# Patient Record
Sex: MALE | Race: WHITE | NOT HISPANIC OR LATINO | Employment: FULL TIME | URBAN - METROPOLITAN AREA
[De-identification: names, ages, dates, MRNs, and addresses within clinical notes are randomized per-mention and may not be internally consistent; named-entity substitution may affect disease eponyms.]

---

## 2017-01-06 ENCOUNTER — GENERIC CONVERSION - ENCOUNTER (OUTPATIENT)
Dept: OTHER | Facility: OTHER | Age: 57
End: 2017-01-06

## 2017-02-28 ENCOUNTER — TRANSCRIBE ORDERS (OUTPATIENT)
Dept: URGENT CARE | Facility: MEDICAL CENTER | Age: 57
End: 2017-02-28

## 2017-02-28 ENCOUNTER — HOSPITAL ENCOUNTER (OUTPATIENT)
Dept: RADIOLOGY | Facility: MEDICAL CENTER | Age: 57
Discharge: HOME | End: 2017-02-28
Admitting: PREVENTIVE MEDICINE
Payer: OTHER MISCELLANEOUS

## 2017-02-28 ENCOUNTER — APPOINTMENT (OUTPATIENT)
Dept: URGENT CARE | Facility: MEDICAL CENTER | Age: 57
End: 2017-02-28
Payer: OTHER MISCELLANEOUS

## 2017-02-28 DIAGNOSIS — T14.90XA INJURY: ICD-10-CM

## 2017-02-28 DIAGNOSIS — T14.90XA INJURY: Primary | ICD-10-CM

## 2017-02-28 PROCEDURE — 99213 OFFICE O/P EST LOW 20 MIN: CPT

## 2017-02-28 PROCEDURE — 73564 X-RAY EXAM KNEE 4 OR MORE: CPT

## 2017-03-28 ENCOUNTER — APPOINTMENT (OUTPATIENT)
Dept: URGENT CARE | Facility: MEDICAL CENTER | Age: 57
End: 2017-03-28
Payer: OTHER MISCELLANEOUS

## 2017-03-28 PROCEDURE — 99213 OFFICE O/P EST LOW 20 MIN: CPT

## 2017-03-28 PROCEDURE — 20605 DRAIN/INJ JOINT/BURSA W/O US: CPT

## 2017-04-04 ENCOUNTER — APPOINTMENT (OUTPATIENT)
Dept: URGENT CARE | Facility: MEDICAL CENTER | Age: 57
End: 2017-04-04
Payer: OTHER MISCELLANEOUS

## 2017-04-04 PROCEDURE — 99213 OFFICE O/P EST LOW 20 MIN: CPT

## 2017-04-11 ENCOUNTER — APPOINTMENT (OUTPATIENT)
Dept: URGENT CARE | Facility: MEDICAL CENTER | Age: 57
End: 2017-04-11
Payer: OTHER MISCELLANEOUS

## 2017-04-11 PROCEDURE — 99213 OFFICE O/P EST LOW 20 MIN: CPT

## 2017-05-10 ENCOUNTER — GENERIC CONVERSION - ENCOUNTER (OUTPATIENT)
Dept: OTHER | Facility: OTHER | Age: 57
End: 2017-05-10

## 2017-08-18 ENCOUNTER — HOSPITAL ENCOUNTER (OUTPATIENT)
Dept: RADIOLOGY | Age: 57
Discharge: HOME | End: 2017-08-18
Attending: THORACIC SURGERY (CARDIOTHORACIC VASCULAR SURGERY)
Payer: COMMERCIAL

## 2017-08-18 DIAGNOSIS — Q23.1 CONGENITAL INSUFFICIENCY OF AORTIC VALVE: ICD-10-CM

## 2017-08-18 PROCEDURE — 71250 CT THORAX DX C-: CPT

## 2017-08-24 ENCOUNTER — ALLSCRIPTS OFFICE VISIT (OUTPATIENT)
Dept: OTHER | Facility: OTHER | Age: 57
End: 2017-08-24

## 2018-01-15 VITALS
HEART RATE: 90 BPM | BODY MASS INDEX: 22.33 KG/M2 | SYSTOLIC BLOOD PRESSURE: 128 MMHG | HEIGHT: 74 IN | RESPIRATION RATE: 14 BRPM | OXYGEN SATURATION: 98 % | WEIGHT: 174 LBS | DIASTOLIC BLOOD PRESSURE: 82 MMHG | TEMPERATURE: 98.6 F

## 2018-05-24 DIAGNOSIS — I72.9 ANEURYSM (HCC): Primary | ICD-10-CM

## 2018-05-25 ENCOUNTER — TRANSCRIBE ORDERS (OUTPATIENT)
Dept: LAB | Facility: CLINIC | Age: 58
End: 2018-05-25

## 2018-05-25 ENCOUNTER — APPOINTMENT (OUTPATIENT)
Dept: LAB | Facility: CLINIC | Age: 58
End: 2018-05-25
Payer: COMMERCIAL

## 2018-05-25 DIAGNOSIS — I72.9 ANEURYSM (HCC): ICD-10-CM

## 2018-05-25 LAB
BUN SERPL-MCNC: 20 MG/DL (ref 5–25)
CREAT SERPL-MCNC: 1.13 MG/DL (ref 0.6–1.3)
GFR SERPL CREATININE-BSD FRML MDRD: 71 ML/MIN/1.73SQ M

## 2018-05-25 PROCEDURE — 82565 ASSAY OF CREATININE: CPT

## 2018-05-25 PROCEDURE — 84520 ASSAY OF UREA NITROGEN: CPT

## 2018-05-25 PROCEDURE — 36415 COLL VENOUS BLD VENIPUNCTURE: CPT

## 2018-05-31 ENCOUNTER — HOSPITAL ENCOUNTER (OUTPATIENT)
Dept: RADIOLOGY | Facility: HOSPITAL | Age: 58
Discharge: HOME | End: 2018-05-31
Attending: THORACIC SURGERY (CARDIOTHORACIC VASCULAR SURGERY)
Payer: COMMERCIAL

## 2018-05-31 ENCOUNTER — TRANSCRIBE ORDERS (OUTPATIENT)
Dept: RADIOLOGY | Facility: HOSPITAL | Age: 58
End: 2018-05-31

## 2018-05-31 DIAGNOSIS — I72.9 ANEURYSM (HCC): ICD-10-CM

## 2018-05-31 PROCEDURE — 71275 CT ANGIOGRAPHY CHEST: CPT

## 2018-05-31 RX ADMIN — IOHEXOL 90 ML: 350 INJECTION, SOLUTION INTRAVENOUS at 14:39

## 2018-06-08 ENCOUNTER — OFFICE VISIT (OUTPATIENT)
Dept: CARDIAC SURGERY | Facility: CLINIC | Age: 58
End: 2018-06-08
Payer: COMMERCIAL

## 2018-06-08 VITALS
OXYGEN SATURATION: 98 % | DIASTOLIC BLOOD PRESSURE: 68 MMHG | SYSTOLIC BLOOD PRESSURE: 102 MMHG | HEIGHT: 74 IN | TEMPERATURE: 97.4 F | RESPIRATION RATE: 14 BRPM | WEIGHT: 169 LBS | HEART RATE: 66 BPM | BODY MASS INDEX: 21.69 KG/M2

## 2018-06-08 DIAGNOSIS — Q23.1 BICUSPID AORTIC VALVE: Primary | ICD-10-CM

## 2018-06-08 DIAGNOSIS — I71.9 ANEURYSM OF AORTIC ROOT (HCC): ICD-10-CM

## 2018-06-08 PROCEDURE — 99214 OFFICE O/P EST MOD 30 MIN: CPT | Performed by: NURSE PRACTITIONER

## 2018-06-08 RX ORDER — DEXLANSOPRAZOLE 60 MG/1
1 CAPSULE, DELAYED RELEASE ORAL AS NEEDED
COMMUNITY
Start: 2011-10-05 | End: 2021-06-22 | Stop reason: HOSPADM

## 2018-06-08 RX ORDER — ATENOLOL 25 MG/1
25 TABLET ORAL 2 TIMES DAILY
Refills: 0 | COMMUNITY
Start: 2018-06-03 | End: 2019-06-10 | Stop reason: SDUPTHER

## 2018-06-08 RX ORDER — IBUPROFEN 600 MG/1
600 TABLET ORAL AS NEEDED
COMMUNITY
Start: 2017-05-06

## 2018-06-08 RX ORDER — DICLOFENAC SODIUM 75 MG/1
75 TABLET, DELAYED RELEASE ORAL AS NEEDED
COMMUNITY

## 2018-06-08 RX ORDER — LEVOTHYROXINE SODIUM 88 UG/1
1 TABLET ORAL DAILY
Refills: 0 | COMMUNITY
Start: 2018-06-05

## 2018-06-08 NOTE — PROGRESS NOTES
Aortic Clinic  Nicolas Walden 61 y o  male MRN: [id number]    Physician Requesting Consult: Dr Katherine Liu    Reason for Consult / Principal Problem: Bicuspid Aortic Valve and Aortic Root Aneurysm    History of Present Illness: Nicolas Walden is a 61y o  year old male, known to our service and followed in our aortic clinic for a bicuspid aortic valve and aortic root aneurysm  He returns to our office today, earlier than he previously scheduled appointment, at the request of his cardiologist Dr Katherine Liu  He was diagnosed with a bicuspid aortic valve and associated aortic root aneurysm in 2011 when he underwent routine cancer surveillance imaging after treatment for 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)  At that time (2011), his aortic root aneurysm measured 48 mm at the SOV and his BAV demonstrated only mild AS  He was seen in our aortic clinic for initial consultation in 2016 at which time his aneurysm appeared stable on CT scan, measuring 46 mm and echo demonstrated no change in his BAV function  He was seen again August 2017 for routine follow up with echo and CT scan  His aneurysm was noted to be stable in size and echo demonstrated stable findings of is BAV  He returns today due to concerns from his cardiologist of possible enlargement of his aneurysm  Upon interview today, Gene Gama denies any significant change in how he is feeling  He exercises regularly but had stopped at the recommendation of his cardiologist until he was seen in out office  He admits to slight increase in fatigue over the past several months but attributes it to eliminating caffeine from his diet and not exercising regularly  He aslo notes he becomes dizzy when he takes hot showers and therefore has had to reduce the temp of thew water  He also experiences occasional orthostasis  He denies SOB, angina, presyncope/syncope, weight gain, edema, PND or orthopnea       Recent CTA demonstrates no change in the size of his aortic root aneurysm, maximal diameter measures 46 mm and his echo demonstrates mild AS (no change)  Past Medical History:  Past Medical History:   Diagnosis Date    Aneurysm of aortic root ([address] )     Bicuspid aortic valve     H/O hyperthyroidism     History of diverticulitis     History of malignant neoplasm of tonsil     History of rectal fissure     Hypertension          Past Surgical History:   Past Surgical History:   Procedure Laterality Date    INGUINAL HERNIA REPAIR      TONSILLECTOMY      UMBILICAL HERNIA REPAIR           Family History:  Family History   Problem Relation Age of Onset    Dementia Mother     Other Family      Sarcoma         Social History:    History   Alcohol Use No     History   Drug Use No     History   Smoking Status    Former Smoker    Packs/day: 0 50    Years: 20 00    Types: Cigarettes    Quit date: 1998   Smokeless Tobacco    Never Used     Comment: on/off X about 20 years         Home Medications:   Prior to Admission medications    Medication Sig Start Date End Date Taking?  Authorizing Provider   atenolol (TENORMIN) 25 mg tablet Take 25 mg by mouth 2 (two) times a day 6/3/18  Yes Historical Provider, MD   dexlansoprazole (DEXILANT) 60 MG capsule Take 1 tablet by mouth as needed 10/5/11  Yes Historical Provider, MD   diclofenac (VOLTAREN) 75 mg EC tablet Take 75 mg by mouth as needed   Yes Historical Provider, MD   ibuprofen (MOTRIN) 600 mg tablet Take 600 mg by mouth as needed 5/6/17  Yes Historical Provider, MD   levothyroxine 88 mcg tablet Take 1 tablet by mouth daily 6/5/18  Yes Historical Provider, MD       Allergies:  No Known Allergies    Review of Systems:   Review of Systems - History obtained from chart review and the patient  General ROS: positive for  - fatigue  Psychological ROS: negative  Ophthalmic ROS: no visual disturbances  Hematological and Lymphatic ROS: negative for - bleeding problems, blood clots or bruising  Respiratory ROS: no cough, shortness of breath, or wheezing  Cardiovascular ROS: no chest pain or dyspnea on exertion  Gastrointestinal ROS: no abdominal pain, change in bowel habits, or black or bloody stools  Musculoskeletal ROS: negative  Neurological ROS: no TIA or stroke symptoms    Vital Signs:   Vitals:    06/08/18 1300 06/08/18 1320   BP: 104/68 102/68   BP Location: Left arm Right arm   Cuff Size: Adult    Pulse: 66    Resp: 14    Temp: (!) 97 4 °F (36 3 °C)    TempSrc: Oral    SpO2: 98%    Weight: 76 7 kg (169 lb)    Height: 6' 1 5" (1 867 m)        Physical Exam:  General: well developed, no acute distress  HEENT/NECK:  PERRLA  No jugular venous distention  Cardiac:Regular rate and rhythm, No murmurs, rubs or gallops  Carotid arteries: 2+ pulses, no bruits  Pulmonary:  Breath sounds clear bilaterally  Abdomen:  Non-tender, Non-distended  Positive bowel sounds  Upper extremities: 2+ radial pulses, brisk capillary refill  Lower extremities: Extremities warm/dry  Radial/PT/DP pulses 2+ bilaterally  No edema B/L  Neuro: Alert and oriented X 3  Sensation is grossly intact  No focal deficits  Skin: Warm/Dry, without rashes or lesions  Lab Results:   Lab Results   Component Value Date    CREATININE 1 13 05/25/2018    BUN 20 05/25/2018     Imaging Studies:     CTA Chest:   FINDINGS:     VASCULAR STRUCTURES:  Continuing enlargement of the fusiform dilatation of the proximal ascending aorta extending from the level of the bicuspid valve into the mid ascending aorta  After the level of the valve, the aorta measures a maximal 46 mm which   has increased in size from the study of Phyllis 3, 2016 when it measured 43 mm  The proximal aortic arch is a maximal 44 mm in transverse diameter (previously 39 mm)  The more distal ascending aorta and aortic arch are nonaneurysmal   The descending   thoracic aorta is nonaneurysmal   Classic branching anatomy of the aortic arch is present without stenoses in the origins of the great vessels  The celiac artery is ectatic measuring 11 mm  The superior mesenteric artery and bilateral renal arteries   are patent      OTHER FINDINGS:      HEART:  Normal cardiac size  No pericardial effusions      LUNGS:  Stable 2 mm left lower lobe pulmonary nodule (4/59) since February 18, 2011      PLEURA:  No pleural effusion      MEDIASTINUM AND ERIKA:  No mass or significant lymphadenopathy      CHEST WALL AND LOWER NECK:  Unremarkable      VISUALIZED STRUCTURES IN THE UPPER ABDOMEN:  Unremarkable      IMPRESSION:     Bicuspid aortic valve with fusiform aneurysm increasing in size from the previous study measuring a maximal 46 mm      Echocardiogram:   EF 65%  Bicuspid AV with superior-inferior orientation, mild AS  Ascending aorta (SOV) 42 mm    I have personally reviewed pertinent reports  Assessment:  Patient Active Problem List    Diagnosis Date Noted    Bicuspid aortic valve 08/18/2016    Aneurysm of aortic root ([address] ) 08/03/2016       Plan:    CTA imaging performed prior to this visit demonstrates the aortic root measuring 46 mm in size at its greatest diameter  This is stable and unchanged as compared to his prior imaging  His echo demonstrates stable findings, no change in the AS  These findings were confirmed and shared with the patient today  He has not had a significant change in symptoms and his imaging is stable, follow-up monitoring is the treatment plan  Arrangements have been made for future surveillance to be completed with non-contrast chest CT scan and transthoracic echocardiogram in 1 year  Antoinette Haider was comfortable with our recommendations, and his questions were answered to his satisfaction  Thank you for allowing us to participate in the care of this patient  Aortic Aneurysm Instructions were provided to the patient as follows:    1  No lifting more than 50 pounds  Regular aerobic exercise permitted and recommended     2  Maintain a controlled blood pressure with a goal of less than 140/80  3  Follow up in Aortic Clinic as recommended with radiology follow up as instructed  4  Report to the ER or call 911 immediately with the following signs / symptoms: sudden onset of back pain, chest pain or shortness of breath  5  Tobacco cessation discussed      SIGNATURE: RAVEN Stinson  DATE: June 8, 2018  TIME: 2:28 PM

## 2018-06-08 NOTE — LETTER
June 8, 2018     Eleuterio Bravo MD  [address]  [facility]  [address] [facility] [address] [address]    Patient: Deena Crane   YOB: 1960   Date of Visit: 6/8/2018       Dear Dr Bertrand Meigs: Thank you for referring David Moreno to me for evaluation  Below are my notes for this consultation  If you have questions, please do not hesitate to call me  I look forward to following your patient along with you  Sincerely,        Marylen Glad, MD        CC: MD Mitchell Harrison CRNP  6/8/2018  2:54 PM  Attested  Aortic Clinic  Deena Crane 61 y o  male MRN: [id number]    Physician Requesting Consult: Dr Bertrand Meigs    Reason for Consult / Principal Problem: Bicuspid Aortic Valve and Aortic Root Aneurysm    History of Present Illness: Deena Crane is a 61y o  year old male, known to our service and followed in our aortic clinic for a bicuspid aortic valve and aortic root aneurysm  He returns to our office today, earlier than he previously scheduled appointment, at the request of his cardiologist Dr Bertrand Meigs  He was diagnosed with a bicuspid aortic valve and associated aortic root aneurysm in 2011 when he underwent routine cancer surveillance imaging after treatment for 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)  At that time (2011), his aortic root aneurysm measured 48 mm at the SOV and his BAV demonstrated only mild AS  He was seen in our aortic clinic for initial consultation in 2016 at which time his aneurysm appeared stable on CT scan, measuring 46 mm and echo demonstrated no change in his BAV function  He was seen again August 2017 for routine follow up with echo and CT scan  His aneurysm was noted to be stable in size and echo demonstrated stable findings of is BAV  He returns today due to concerns from his cardiologist of possible enlargement of his aneurysm      Upon interview today, Shahbaz Marie denies any significant change in how he is feeling  He exercises regularly but had stopped at the recommendation of his cardiologist until he was seen in out office  He admits to slight increase in fatigue over the past several months but attributes it to eliminating caffeine from his diet and not exercising regularly  He aslo notes he becomes dizzy when he takes hot showers and therefore has had to reduce the temp of thew water  He also experiences occasional orthostasis  He denies SOB, angina, presyncope/syncope, weight gain, edema, PND or orthopnea  Recent CTA demonstrates no change in the size of his aortic root aneurysm, maximal diameter measures 46 mm and his echo demonstrates mild AS (no change)  Past Medical History:  Past Medical History:   Diagnosis Date    Aneurysm of aortic root ([address] )     Bicuspid aortic valve     H/O hyperthyroidism     History of diverticulitis     History of malignant neoplasm of tonsil     History of rectal fissure     Hypertension          Past Surgical History:   Past Surgical History:   Procedure Laterality Date    INGUINAL HERNIA REPAIR      TONSILLECTOMY      UMBILICAL HERNIA REPAIR           Family History:  Family History   Problem Relation Age of Onset    Dementia Mother     Other Family      Sarcoma         Social History:    History   Alcohol Use No     History   Drug Use No     History   Smoking Status    Former Smoker    Packs/day: 0 50    Years: 20 00    Types: Cigarettes    Quit date: 1998   Smokeless Tobacco    Never Used     Comment: on/off X about 20 years         Home Medications:   Prior to Admission medications    Medication Sig Start Date End Date Taking?  Authorizing Provider   atenolol (TENORMIN) 25 mg tablet Take 25 mg by mouth 2 (two) times a day 6/3/18  Yes Historical Provider, MD   dexlansoprazole (DEXILANT) 60 MG capsule Take 1 tablet by mouth as needed 10/5/11  Yes Historical Provider, MD   diclofenac (VOLTAREN) 75 mg EC tablet Take 75 mg by mouth as needed   Yes Historical Provider, MD   ibuprofen (MOTRIN) 600 mg tablet Take 600 mg by mouth as needed 5/6/17  Yes Historical Provider, MD   levothyroxine 88 mcg tablet Take 1 tablet by mouth daily 6/5/18  Yes Historical Provider, MD       Allergies:  No Known Allergies    Review of Systems:   Review of Systems - History obtained from chart review and the patient  General ROS: positive for  - fatigue  Psychological ROS: negative  Ophthalmic ROS: no visual disturbances  Hematological and Lymphatic ROS: negative for - bleeding problems, blood clots or bruising  Respiratory ROS: no cough, shortness of breath, or wheezing  Cardiovascular ROS: no chest pain or dyspnea on exertion  Gastrointestinal ROS: no abdominal pain, change in bowel habits, or black or bloody stools  Musculoskeletal ROS: negative  Neurological ROS: no TIA or stroke symptoms    Vital Signs:   Vitals:    06/08/18 1300 06/08/18 1320   BP: 104/68 102/68   BP Location: Left arm Right arm   Cuff Size: Adult    Pulse: 66    Resp: 14    Temp: (!) 97 4 °F (36 3 °C)    TempSrc: Oral    SpO2: 98%    Weight: 76 7 kg (169 lb)    Height: 6' 1 5" (1 867 m)        Physical Exam:  General: well developed, no acute distress  HEENT/NECK:  PERRLA  No jugular venous distention  Cardiac:Regular rate and rhythm, No murmurs, rubs or gallops  Carotid arteries: 2+ pulses, no bruits  Pulmonary:  Breath sounds clear bilaterally  Abdomen:  Non-tender, Non-distended  Positive bowel sounds  Upper extremities: 2+ radial pulses, brisk capillary refill  Lower extremities: Extremities warm/dry  Radial/PT/DP pulses 2+ bilaterally  No edema B/L  Neuro: Alert and oriented X 3  Sensation is grossly intact  No focal deficits  Skin: Warm/Dry, without rashes or lesions      Lab Results:   Lab Results   Component Value Date    CREATININE 1 13 05/25/2018    BUN 20 05/25/2018     Imaging Studies:     CTA Chest:   FINDINGS:     VASCULAR STRUCTURES:  Continuing enlargement of the fusiform dilatation of the proximal ascending aorta extending from the level of the bicuspid valve into the mid ascending aorta  After the level of the valve, the aorta measures a maximal 46 mm which   has increased in size from the study of Phyllis 3, 2016 when it measured 43 mm  The proximal aortic arch is a maximal 44 mm in transverse diameter (previously 39 mm)  The more distal ascending aorta and aortic arch are nonaneurysmal   The descending   thoracic aorta is nonaneurysmal   Classic branching anatomy of the aortic arch is present without stenoses in the origins of the great vessels  The celiac artery is ectatic measuring 11 mm  The superior mesenteric artery and bilateral renal arteries   are patent      OTHER FINDINGS:      HEART:  Normal cardiac size  No pericardial effusions      LUNGS:  Stable 2 mm left lower lobe pulmonary nodule (4/59) since February 18, 2011      PLEURA:  No pleural effusion      MEDIASTINUM AND ERIKA:  No mass or significant lymphadenopathy      CHEST WALL AND LOWER NECK:  Unremarkable      VISUALIZED STRUCTURES IN THE UPPER ABDOMEN:  Unremarkable      IMPRESSION:     Bicuspid aortic valve with fusiform aneurysm increasing in size from the previous study measuring a maximal 46 mm      Echocardiogram:   EF 65%  Bicuspid AV with superior-inferior orientation, mild AS  Ascending aorta (SOV) 42 mm    I have personally reviewed pertinent reports  Assessment:  Patient Active Problem List    Diagnosis Date Noted    Bicuspid aortic valve 08/18/2016    Aneurysm of aortic root ([address] ) 08/03/2016       Plan:    CTA imaging performed prior to this visit demonstrates the aortic root measuring 46 mm in size at its greatest diameter  This is stable and unchanged as compared to his prior imaging  His echo demonstrates stable findings, no change in the AS  These findings were confirmed and shared with the patient today        He has not had a significant change in symptoms and his imaging is stable, follow-up monitoring is the treatment plan  Arrangements have been made for future surveillance to be completed with non-contrast chest CT scan and transthoracic echocardiogram in 1 year  Florina Lacy was comfortable with our recommendations, and his questions were answered to his satisfaction  Thank you for allowing us to participate in the care of this patient  Aortic Aneurysm Instructions were provided to the patient as follows:    1  No lifting more than 50 pounds  Regular aerobic exercise permitted and recommended  2  Maintain a controlled blood pressure with a goal of less than 140/80  3  Follow up in Aortic Clinic as recommended with radiology follow up as instructed  4  Report to the ER or call 911 immediately with the following signs / symptoms: sudden onset of back pain, chest pain or shortness of breath  5  Tobacco cessation discussed  SIGNATURE: RAVEN Yin  DATE: June 8, 2018  TIME: 2:28 PM  Attestation signed by Polly Washburn MD at 6/8/2018  3:08 PM:  Patient seen and evaluated with 10 Avelina Garcia / ALEXANDAR  I agree with the above assessment and plan with the following additions  The patient is a 0 atrial man with an asymptomatic aortic root aneurysm and bicuspid aortic valve with mild aortic stenosis  Most recent CTA of the chest reveals a root with a maximum diameter of 4 5 cms at the level of the sinus of Valsalva, ascending aorta is of normal size at 3 6 cms  TTE shows same measurement at the level of the sinus of Valsalva  This is unchanged from previous studies on August 2017  There is no need for surgical intervention at this point, I recommend follow-up in 1 year with a CT and a transthoracic echo

## 2019-02-13 ENCOUNTER — OFFICE VISIT (OUTPATIENT)
Dept: CARDIOLOGY CLINIC | Facility: CLINIC | Age: 59
End: 2019-02-13
Payer: COMMERCIAL

## 2019-02-13 VITALS
DIASTOLIC BLOOD PRESSURE: 94 MMHG | BODY MASS INDEX: 23.06 KG/M2 | WEIGHT: 174 LBS | HEIGHT: 73 IN | HEART RATE: 72 BPM | SYSTOLIC BLOOD PRESSURE: 126 MMHG

## 2019-02-13 DIAGNOSIS — Q23.1 BICUSPID AORTIC VALVE: Primary | ICD-10-CM

## 2019-02-13 DIAGNOSIS — I71.9 ANEURYSM OF AORTIC ROOT (HCC): ICD-10-CM

## 2019-02-13 PROBLEM — I10 ESSENTIAL HYPERTENSION: Status: ACTIVE | Noted: 2019-02-13

## 2019-02-13 PROCEDURE — 93000 ELECTROCARDIOGRAM COMPLETE: CPT | Performed by: INTERNAL MEDICINE

## 2019-02-13 PROCEDURE — 99204 OFFICE O/P NEW MOD 45 MIN: CPT | Performed by: INTERNAL MEDICINE

## 2019-02-13 NOTE — PROGRESS NOTES
[facility]     Obed Torres / 60 y o  male / : 1960 / MRN: [id number]  Date of Visit: 19    ASSESSMENT/PLAN    Bicuspid aortic valve  · Mild AS by last echo     Aneurysm of aortic root ([facility] [address] )  · 4 2cm by last echo  Will repeat echo in 1 year  · Patient reports this has remained stable for 8-9 years  Essential hypertension  · Well controlled on atenolol 25mg BID    Echo and f/u visit in 1 year  SUBJECTIVE:  HPI: Obed Torres is a 60y o  year-old male who presents to transfer care regarding bicuspid aortic valve, thoracic aortic aneurysm, and HTN  Today he has no complaints other than some lightheadedness if he gets out of bed too quickly  His BP has been well controlled on atenolol and averages 120s/70-80s  He reports that his aneurysm has been stable for 8-9 years  It was found incidentally on CT while he was undergoing treatment for tonsillar cancer  He denies recent chest pain, palpitations, dyspnea, edema, near-syncope and syncope      EKG: Sinus rhythm, LA enlargement, LVH with secondary ST changes    Other cardiac testing:   Echo May 2018:   · Normal LV systolic function, EF 77%   · Bicuspid aortic valve with superior-inferior orientation  · Mild AS, mild MR   · Dilation of ascending aorta, 4 2cm    No Known Allergies      Current Outpatient Medications:     atenolol (TENORMIN) 25 mg tablet, Take 25 mg by mouth 2 (two) times a day, Disp: , Rfl: 0    diclofenac (VOLTAREN) 75 mg EC tablet, Take 75 mg by mouth as needed, Disp: , Rfl:     levothyroxine 88 mcg tablet, Take 1 tablet by mouth daily, Disp: , Rfl: 0    dexlansoprazole (DEXILANT) 60 MG capsule, Take 1 tablet by mouth as needed, Disp: , Rfl:     ibuprofen (MOTRIN) 600 mg tablet, Take 600 mg by mouth as needed, Disp: , Rfl:     Past Medical History:   Diagnosis Date    Aneurysm of aortic root ([facility] [address] )     Bicuspid aortic valve     H/O hyperthyroidism     History of diverticulitis     History of echocardiogram 2018    EF 65%, bicuspid AV with superior-inferior irientation, mild AS, mild MR, trace TR, mildly dilated ascending aorta (sinus of valsalva is 4 2-cm)    History of malignant neoplasm of tonsil     History of rectal fissure     Hypertension     Hypothyroidism        Family History   Problem Relation Age of Onset    Dementia Mother     Other Family         Sarcoma       Past Surgical History:   Procedure Laterality Date    INGUINAL HERNIA REPAIR      TONSILLECTOMY      UMBILICAL HERNIA REPAIR         Social History     Socioeconomic History    Marital status: /Civil Union     Spouse name: Not on file    Number of children: Not on file    Years of education: Not on file    Highest education level: Not on file   Occupational History    Not on file   Social Needs    Financial resource strain: Not on file    Food insecurity:     Worry: Not on file     Inability: Not on file    Transportation needs:     Medical: Not on file     Non-medical: Not on file   Tobacco Use    Smoking status: Former Smoker     Packs/day: 0 50     Years: 20 00     Pack years: 10 00     Types: Cigarettes     Last attempt to quit:      Years since quittin 1    Smokeless tobacco: Current User    Tobacco comment: on/off X about 20 years   Substance and Sexual Activity    Alcohol use: No    Drug use: No    Sexual activity: Not on file   Lifestyle    Physical activity:     Days per week: Not on file     Minutes per session: Not on file    Stress: Not on file   Relationships    Social connections:     Talks on phone: Not on file     Gets together: Not on file     Attends Congregational service: Not on file     Active member of club or organization: Not on file     Attends meetings of clubs or organizations: Not on file     Relationship status: Not on file    Intimate partner violence:     Fear of current or ex partner: Not on file     Emotionally abused: Not on file     Physically abused: Not on file     Forced sexual activity: Not on file   Other Topics Concern    Not on file   Social History Narrative    Not on file       Review of Systems   Constitution: Negative  HENT: Negative  Eyes: Negative  Cardiovascular: Negative for chest pain, claudication, dyspnea on exertion, irregular heartbeat, leg swelling, near-syncope, orthopnea, palpitations, paroxysmal nocturnal dyspnea and syncope  Respiratory: Negative for cough, shortness of breath and wheezing  Endocrine: Negative  Skin: Negative  Musculoskeletal: Negative  Gastrointestinal: Negative  Genitourinary: Negative  Neurological: Positive for light-headedness  Negative for dizziness  Psychiatric/Behavioral: Negative  OBJECTIVE:  Vitals: /94   Pulse 72   Ht 6' 1" (1 854 m)   Wt 78 9 kg (174 lb)   BMI 22 96 kg/m²     Physical exam:   Physical Exam   Constitutional: He is oriented to person, place, and time  He appears well-developed and well-nourished  No distress  HENT:   Head: Normocephalic and atraumatic  Eyes: EOM are normal  No scleral icterus  Neck: Normal range of motion  Neck supple  No JVD present  Cardiovascular: Normal rate, regular rhythm, S1 normal, S2 normal and intact distal pulses  Murmur (Grade 2 systolic murmur at the base w/ radiation to the neck) heard  Pulmonary/Chest: Effort normal and breath sounds normal  He has no wheezes  He has no rales  Abdominal: Soft  Bowel sounds are normal    Musculoskeletal: He exhibits no edema  Neurological: He is alert and oriented to person, place, and time  Skin: Skin is warm and dry  Psychiatric: He has a normal mood and affect   His behavior is normal        Lab Results:   No results found for: HGBA1C  No results found for: CHOL  No results found for: HDL  No results found for: LDLCALC  No results found for: TRIG  No results found for: CHOLHDL

## 2019-02-13 NOTE — ASSESSMENT & PLAN NOTE
· 4 2cm by last echo  Will repeat echo in 1 year  · Patient reports this has remained stable for 8-9 years

## 2019-06-10 DIAGNOSIS — I10 BENIGN HYPERTENSION: Primary | ICD-10-CM

## 2019-06-10 RX ORDER — ATENOLOL 25 MG/1
25 TABLET ORAL 2 TIMES DAILY
Qty: 180 TABLET | Refills: 3 | Status: SHIPPED | OUTPATIENT
Start: 2019-06-10 | End: 2020-02-24 | Stop reason: SDUPTHER

## 2019-06-12 DIAGNOSIS — I72.9 ANEURYSM (HCC): Primary | ICD-10-CM

## 2019-07-02 ENCOUNTER — HOSPITAL ENCOUNTER (OUTPATIENT)
Dept: CT IMAGING | Facility: HOSPITAL | Age: 59
Discharge: HOME | End: 2019-07-02
Payer: COMMERCIAL

## 2019-07-02 ENCOUNTER — HOSPITAL ENCOUNTER (OUTPATIENT)
Dept: NON INVASIVE DIAGNOSTICS | Facility: HOSPITAL | Age: 59
Discharge: HOME | End: 2019-07-02
Payer: COMMERCIAL

## 2019-07-02 DIAGNOSIS — I71.9 ANEURYSM OF AORTIC ROOT (HCC): ICD-10-CM

## 2019-07-02 DIAGNOSIS — I72.9 ANEURYSM (HCC): ICD-10-CM

## 2019-07-02 DIAGNOSIS — Q23.1 BICUSPID AORTIC VALVE: ICD-10-CM

## 2019-07-02 PROCEDURE — 71250 CT THORAX DX C-: CPT

## 2019-07-02 PROCEDURE — 93306 TTE W/DOPPLER COMPLETE: CPT | Performed by: INTERNAL MEDICINE

## 2019-07-02 PROCEDURE — 93306 TTE W/DOPPLER COMPLETE: CPT

## 2019-07-03 ENCOUNTER — TELEPHONE (OUTPATIENT)
Dept: CARDIOLOGY CLINIC | Facility: CLINIC | Age: 59
End: 2019-07-03

## 2019-07-25 ENCOUNTER — OFFICE VISIT (OUTPATIENT)
Dept: CARDIAC SURGERY | Facility: CLINIC | Age: 59
End: 2019-07-25
Payer: COMMERCIAL

## 2019-07-25 VITALS
TEMPERATURE: 97.9 F | HEIGHT: 73 IN | RESPIRATION RATE: 18 BRPM | DIASTOLIC BLOOD PRESSURE: 84 MMHG | SYSTOLIC BLOOD PRESSURE: 130 MMHG | WEIGHT: 168 LBS | BODY MASS INDEX: 22.26 KG/M2 | HEART RATE: 78 BPM

## 2019-07-25 DIAGNOSIS — Q23.1 BICUSPID AORTIC VALVE: ICD-10-CM

## 2019-07-25 DIAGNOSIS — I71.9 ANEURYSM OF AORTIC ROOT (HCC): Primary | ICD-10-CM

## 2019-07-25 PROCEDURE — 99213 OFFICE O/P EST LOW 20 MIN: CPT | Performed by: NURSE PRACTITIONER

## 2019-07-25 RX ORDER — DOXYCYCLINE HYCLATE 100 MG
TABLET ORAL
Refills: 0 | COMMUNITY
Start: 2019-07-24 | End: 2021-06-22 | Stop reason: HOSPADM

## 2019-07-25 NOTE — PROGRESS NOTES
Aortic Clinic  Bong Conte 60 y o  male MRN: [id number]      Reason for Consult / Principal Problem: Bicuspid aortic valve and aortic root aneurysm     History of Present Illness: Bong Conte is a 60y o  year old male who presents today for ongoing surveillance of his bicuspid aortic valve and aortic root aneurysm  This was initially identified in 2016 and has been followed in our aortic clinic since that time  Maximal ascending root measurement on previous imaging is 45-46 mm  Echocardiograms in the past have revealed mild stenosis of a bicuspid aortic valve  Bong Conte reports to the office today for a 1 year interval follow up  Upon interview today Az Hernández states he is doing well  His only c/o is fatigue  He states he does not tolerate Metoprolol therefore he is on Atenolol  He feels the fatigue is related to his beta blocker  His BP is well controlled  He denies chest pain, SOB, syncope, TIA/CVA symptoms  He experiences mild occasional lightheadedness with standing  He also has occasional tingling in his right hand and neck but feels it is related to the previous 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  He continues to work construction  He has stopped lifting weights for exercise and he tomás not run anymore  He has taken up yoga recently        Past Medical History:  Past Medical History:   Diagnosis Date    Aneurysm of aortic root ([address] )     Bicuspid aortic valve     H/O hyperthyroidism     History of diverticulitis     History of echocardiogram 05/22/2018    EF 65%, bicuspid AV with superior-inferior irientation, mild AS, mild MR, trace TR, mildly dilated ascending aorta (sinus of valsalva is 4 2-cm)    History of malignant neoplasm of tonsil     History of rectal fissure     Hypertension     Hypothyroidism          Past Surgical History:   Past Surgical History:   Procedure Laterality Date    INGUINAL HERNIA REPAIR      TONSILLECTOMY      UMBILICAL HERNIA REPAIR Family History:  Family History   Problem Relation Age of Onset    Dementia Mother     Other Family         Sarcoma         Social History:    Social History     Substance and Sexual Activity   Alcohol Use No     Social History     Substance and Sexual Activity   Drug Use No     Social History     Tobacco Use   Smoking Status Former Smoker    Packs/day: 0 50    Years: 20 00    Pack years: 10 00    Types: Cigarettes    Last attempt to quit: Elva Mcclure Years since quittin 5   Smokeless Tobacco Current User   Tobacco Comment    on/off X about 20 years         Home Medications:   Prior to Admission medications    Medication Sig Start Date End Date Taking?  Authorizing Provider   atenolol (TENORMIN) 25 mg tablet Take 1 tablet (25 mg total) by mouth 2 (two) times a day 6/10/19  Yes Daniel Colon PA-C   diclofenac (VOLTAREN) 75 mg EC tablet Take 75 mg by mouth as needed   Yes Historical Provider, MD   doxycycline hyclate (VIBRA-TABS) 100 mg tablet take 1 tablet by mouth twice a day until finished 19  Yes Historical Provider, MD   levothyroxine 88 mcg tablet Take 1 tablet by mouth daily 18  Yes Historical Provider, MD   dexlansoprazole (DEXILANT) 60 MG capsule Take 1 tablet by mouth as needed 10/5/11   Historical Provider, MD   ibuprofen (MOTRIN) 600 mg tablet Take 600 mg by mouth as needed 17   Historical Provider, MD       Allergies:  No Known Allergies    Review of Systems:   Review of Systems - History obtained from chart review and the patient  General ROS: positive for  - fatigue  negative for - chills, fever, sleep disturbance or weight gain  Psychological ROS: negative  Ophthalmic ROS: negative  ENT ROS: negative  Hematological and Lymphatic ROS: negative for - bleeding problems, blood clots, bruising or jaundice  Respiratory ROS: no cough, shortness of breath, or wheezing  Cardiovascular ROS: no chest pain or dyspnea on exertion  Gastrointestinal ROS: no abdominal pain, change in bowel habits, or black or bloody stools  Genito-Urinary ROS: no dysuria, trouble voiding, or hematuria  Musculoskeletal ROS: negative  Neurological ROS: no TIA or stroke symptoms  But tingling right hand and left neck  Dermatological ROS: negative for rash and or skin lesions    Vital Signs:   Vitals:    07/25/19 1450   BP: 130/84   BP Location: Right arm   Patient Position: Sitting   Cuff Size: Standard   Pulse: 78   Resp: 18   Temp: 97 9 °F (36 6 °C)   TempSrc: Tympanic   Weight: 76 2 kg (168 lb)   Height: 6' 1" (1 854 m)       Physical Exam:  General: Tall, thin, well developed  No acute distress  HEENT/NECK:  PERRLA  No jugular venous distention  Cardiac:Regular rate and rhythm, No murmurs rubs or gallops  Carotid arteries: 2+ pulses, no bruits  Pulmonary:  Breath sounds clear bilaterally  Abdomen:  Non-tender, Non-distended  Positive bowel sounds  Upper extremities: 2+ pulses radial pulses; brisk capillary refill  Lower extremities: Extremities warm/dry  PT/DP pulses 2+ bilaterally  No edema B/L  Neuro: Alert and oriented X 3  Sensation is grossly intact  No focal deficits  Musculoskeletal: MAEE, stable gait  Skin: Warm/Dry, without rashes or lesions  Lab Results:     Imaging Studies:     CT Chest:   HEART/GREAT VESSELS:  Patient has a known history of bicuspid aortic valve  There is calcification in the region of the valve as well as coronary artery calcification  There is no pericardial effusion  At the level of the valve, aortic diameter is   approximately 4 4 cm, similar to previous CT  Precise determination of size more difficult to on this nonenhanced study  Ascending thoracic aortic diameter measured at level of right pulmonary artery is 3 7 cm, also without increase  Aorta gradually   tapers in size  Typical branching pattern  Echocardiogram:   LEFT VENTRICLE:  Systolic function was normal  Ejection fraction was estimated to be 60 %    There were no regional wall motion abnormalities      AORTIC VALVE:  The valve was bicuspid  Leaflets exhibited moderate calcification and mildly reduced cuspal separation  There was mild stenosis      AORTA:  The root exhibited moderate dilation to 4 5 cm      I have personally reviewed pertinent films in PACS    Assessment:  Patient Active Problem List    Diagnosis Date Noted    Essential hypertension 02/13/2019    Bicuspid aortic valve 08/18/2016    Aneurysm of aortic root ([address] ) 08/03/2016       Plan:    CT imaging performed prior to this visit demonstrates the  Aortic root measuring 44 mm in size at its greatest diameter (ascending aorta 37 mm)  This is stable and unchanged from prior CT scans  Recent echocardiogram demonstrates stable findings as well with only mild stenosis of his bicuspid aortic valve  These findings were confirmed and shared with the patient today  Since he is asymptomatic, with no family history, and stable imaging, follow-up monitoring is the treatment plan  Arrangements have been made for future surveillance to be completed with Non-contrast chest CT scan and transthoracic echocardiogram in 2 Years  Shyla Vallecillo was comfortable with our recommendations, and his questions were answered to his satisfaction  Thank you for allowing us to participate in the care of this patient  Aortic Aneurysm Instructions were provided to the patient as follows:    1  No lifting more than 50 pounds  Regular aerobic exercise permitted and recommended  2  Maintain a controlled blood pressure with a goal of less than 140/80  3  Follow up in Aortic Clinic as recommended with radiology follow up as instructed  4  Report to the ER or call 911 immediately with the following signs / symptoms: sudden onset of back pain, chest pain or shortness of breath        SIGNATURE: RAVEN Hernández  DATE: July 25, 2019  TIME: 3:13 PM

## 2019-07-25 NOTE — LETTER
July 25, 2019     Yaw Lai MD  [facility]  [address] [place] [id number]    Patient: Antoinette Haider   YOB: 1960   Date of Visit: 7/25/2019       Dear Dr Larry Saba:    Thank you for referring Alem Gee to me for evaluation  Below are my notes for this consultation  If you have questions, please do not hesitate to call me  I look forward to following your patient along with you  Sincerely,        Luba Walters MD        CC: MD Kam North CRNP  7/25/2019  3:29 PM  Attested  Aortic Clinic  Antoinette Haider 60 y o  male MRN: [id number]      Reason for Consult / Principal Problem: Bicuspid aortic valve and aortic root aneurysm     History of Present Illness: Antoinette Haider is a 60y o  year old male who presents today for ongoing surveillance of his bicuspid aortic valve and aortic root aneurysm  This was initially identified in 2016 and has been followed in our aortic clinic since that time  Maximal ascending root measurement on previous imaging is 45-46 mm  Echocardiograms in the past have revealed mild stenosis of a bicuspid aortic valve  Antoinette Haider reports to the office today for a 1 year interval follow up  Upon interview today Polly Zuñiga states he is doing well  His only c/o is fatigue  He states he does not tolerate Metoprolol therefore he is on Atenolol  He feels the fatigue is related to his beta blocker  His BP is well controlled  He denies chest pain, SOB, syncope, TIA/CVA symptoms  He experiences mild occasional lightheadedness with standing  He also has occasional tingling in his right hand and neck but feels it is related to the previous 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  He continues to work construction  He has stopped lifting weights for exercise and he tomás not run anymore  He has taken up yoga recently        Past Medical History:  Past Medical History:   Diagnosis Date    Aneurysm of aortic root ([address] )     Bicuspid aortic valve     H/O hyperthyroidism     History of diverticulitis     History of echocardiogram 2018    EF 65%, bicuspid AV with superior-inferior irientation, mild AS, mild MR, trace TR, mildly dilated ascending aorta (sinus of valsalva is 4 2-cm)    History of malignant neoplasm of tonsil     History of rectal fissure     Hypertension     Hypothyroidism          Past Surgical History:   Past Surgical History:   Procedure Laterality Date    INGUINAL HERNIA REPAIR      TONSILLECTOMY      UMBILICAL HERNIA REPAIR           Family History:  Family History   Problem Relation Age of Onset    Dementia Mother     Other Family         Sarcoma         Social History:    Social History     Substance and Sexual Activity   Alcohol Use No     Social History     Substance and Sexual Activity   Drug Use No     Social History     Tobacco Use   Smoking Status Former Smoker    Packs/day: 0 50    Years: 20 00    Pack years: 10 00    Types: Cigarettes    Last attempt to quit:     Years since quittin 5   Smokeless Tobacco Current User   Tobacco Comment    on/off X about 20 years         Home Medications:   Prior to Admission medications    Medication Sig Start Date End Date Taking?  Authorizing Provider   atenolol (TENORMIN) 25 mg tablet Take 1 tablet (25 mg total) by mouth 2 (two) times a day 6/10/19  Yes Demario Hernandez PA-C   diclofenac (VOLTAREN) 75 mg EC tablet Take 75 mg by mouth as needed   Yes Historical Provider, MD   doxycycline hyclate (VIBRA-TABS) 100 mg tablet take 1 tablet by mouth twice a day until finished 19  Yes Historical Provider, MD   levothyroxine 88 mcg tablet Take 1 tablet by mouth daily 18  Yes Historical Provider, MD   dexlansoprazole (DEXILANT) 60 MG capsule Take 1 tablet by mouth as needed 10/5/11   Historical Provider, MD   ibuprofen (MOTRIN) 600 mg tablet Take 600 mg by mouth as needed 17   Historical Provider, MD       Allergies:  No Known Allergies    Review of Systems:   Review of Systems - History obtained from chart review and the patient  General ROS: positive for  - fatigue  negative for - chills, fever, sleep disturbance or weight gain  Psychological ROS: negative  Ophthalmic ROS: negative  ENT ROS: negative  Hematological and Lymphatic ROS: negative for - bleeding problems, blood clots, bruising or jaundice  Respiratory ROS: no cough, shortness of breath, or wheezing  Cardiovascular ROS: no chest pain or dyspnea on exertion  Gastrointestinal ROS: no abdominal pain, change in bowel habits, or black or bloody stools  Genito-Urinary ROS: no dysuria, trouble voiding, or hematuria  Musculoskeletal ROS: negative  Neurological ROS: no TIA or stroke symptoms  But tingling right hand and left neck  Dermatological ROS: negative for rash and or skin lesions    Vital Signs:   Vitals:    07/25/19 1450   BP: 130/84   BP Location: Right arm   Patient Position: Sitting   Cuff Size: Standard   Pulse: 78   Resp: 18   Temp: 97 9 °F (36 6 °C)   TempSrc: Tympanic   Weight: 76 2 kg (168 lb)   Height: 6' 1" (1 854 m)       Physical Exam:  General: Tall, thin, well developed  No acute distress  HEENT/NECK:  PERRLA  No jugular venous distention  Cardiac:Regular rate and rhythm, No murmurs rubs or gallops  Carotid arteries: 2+ pulses, no bruits  Pulmonary:  Breath sounds clear bilaterally  Abdomen:  Non-tender, Non-distended  Positive bowel sounds  Upper extremities: 2+ pulses radial pulses; brisk capillary refill  Lower extremities: Extremities warm/dry  PT/DP pulses 2+ bilaterally  No edema B/L  Neuro: Alert and oriented X 3  Sensation is grossly intact  No focal deficits  Musculoskeletal: MAEE, stable gait  Skin: Warm/Dry, without rashes or lesions  Lab Results:     Imaging Studies:     CT Chest:   HEART/GREAT VESSELS:  Patient has a known history of bicuspid aortic valve    There is calcification in the region of the valve as well as coronary artery calcification  There is no pericardial effusion  At the level of the valve, aortic diameter is   approximately 4 4 cm, similar to previous CT  Precise determination of size more difficult to on this nonenhanced study  Ascending thoracic aortic diameter measured at level of right pulmonary artery is 3 7 cm, also without increase  Aorta gradually   tapers in size  Typical branching pattern  Echocardiogram:   LEFT VENTRICLE:  Systolic function was normal  Ejection fraction was estimated to be 60 %  There were no regional wall motion abnormalities      AORTIC VALVE:  The valve was bicuspid  Leaflets exhibited moderate calcification and mildly reduced cuspal separation  There was mild stenosis      AORTA:  The root exhibited moderate dilation to 4 5 cm      I have personally reviewed pertinent films in PACS    Assessment:  Patient Active Problem List    Diagnosis Date Noted    Essential hypertension 02/13/2019    Bicuspid aortic valve 08/18/2016    Aneurysm of aortic root ([address] ) 08/03/2016       Plan:    CT imaging performed prior to this visit demonstrates the  Aortic root measuring 44 mm in size at its greatest diameter (ascending aorta 37 mm)  This is stable and unchanged from prior CT scans  Recent echocardiogram demonstrates stable findings as well with only mild stenosis of his bicuspid aortic valve  These findings were confirmed and shared with the patient today  Since he is asymptomatic, with no family history, and stable imaging, follow-up monitoring is the treatment plan  Arrangements have been made for future surveillance to be completed with Non-contrast chest CT scan and transthoracic echocardiogram in 2 Years  Lana Coe was comfortable with our recommendations, and his questions were answered to his satisfaction  Thank you for allowing us to participate in the care of this patient  Aortic Aneurysm Instructions were provided to the patient as follows:    1   No lifting more than 50 pounds  Regular aerobic exercise permitted and recommended  2  Maintain a controlled blood pressure with a goal of less than 140/80  3  Follow up in Aortic Clinic as recommended with radiology follow up as instructed  4  Report to the ER or call 911 immediately with the following signs / symptoms: sudden onset of back pain, chest pain or shortness of breath  SIGNATURE: RAVEN Cates  DATE: July 25, 2019  TIME: 3:13 PM  Attestation signed by Clarita Thomas MD at 7/25/2019  3:44 PM:  Patient seen and evaluated with Heather Garcia / ALEXANDRA  I agree with the above assessment and plan with the following additions  Patient is a 58-year-old man with an asymptomatic aortic root aneurysm in the setting of a bicuspid aortic valve with mild aortic stenosis  Most recent CT of the chest shows an unchanged ascending aorta measuring 3 6 cm and aortic root measuring 4 5 cm  Transthoracic echocardiogram shows an unchanged mild aortic stenosis  Plan:  Follow-up in 2 years CT of the chest and transthoracic echo

## 2019-09-04 ENCOUNTER — OFFICE VISIT (OUTPATIENT)
Dept: SURGERY | Facility: CLINIC | Age: 59
End: 2019-09-04
Payer: COMMERCIAL

## 2019-09-04 VITALS
RESPIRATION RATE: 18 BRPM | DIASTOLIC BLOOD PRESSURE: 72 MMHG | HEART RATE: 74 BPM | HEIGHT: 73 IN | SYSTOLIC BLOOD PRESSURE: 130 MMHG | TEMPERATURE: 97.6 F | BODY MASS INDEX: 22 KG/M2 | WEIGHT: 166 LBS

## 2019-09-04 DIAGNOSIS — K40.91 RECURRENT RIGHT INGUINAL HERNIA: Primary | ICD-10-CM

## 2019-09-04 PROCEDURE — 99203 OFFICE O/P NEW LOW 30 MIN: CPT | Performed by: SURGERY

## 2019-09-04 NOTE — ASSESSMENT & PLAN NOTE
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  He reports of remote history of bilateral inguinal herniorrhaphy  Those repairs were performed without the use of mesh  On physical examination the patient is a well-appearing male  He is in no acute distress  He is a competent reliable historian  His abdomen is flat  On inspection there are no signs of umbilical or inguinal hernia bilaterally  On palpation I do not appreciate any obvious hernia in either the right or left groin  The testes are descended bilaterally  The patient provides a good history for an early recurrent right inguinal hernia  The physical examination is reassuring that he is safe  Given the normal physical examination surgery is not mandatory at the present time  We discussed 2 treatment options including observation and follow-up in 6 months  Or right groin exploration as an alternative  The patient was happy to avoid surgery at the present time  I look for to seeing him back in 6 months time or sooner on an as-needed basis

## 2019-09-04 NOTE — PROGRESS NOTES
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  He reports of remote history of bilateral inguinal herniorrhaphy  Those repairs were performed without the use of mesh  On physical examination the patient is a well-appearing male  He is in no acute distress  He is a competent reliable historian  His abdomen is flat  On inspection there are no signs of umbilical or inguinal hernia bilaterally  On palpation I do not appreciate any obvious hernia in either the right or left groin  The testes are descended bilaterally  The patient provides a good history for an early recurrent right inguinal hernia  The physical examination is reassuring that he is safe  Given the normal physical examination surgery is not mandatory at the present time  We discussed 2 treatment options including observation and follow-up in 6 months  Or right groin exploration as an alternative  The patient was happy to avoid surgery at the present time  I look for to seeing him back in 6 months time or sooner on an as-needed basis  Diagnoses and all orders for this visit:    Recurrent right inguinal hernia          Subjective:      Patient ID: Leann Bee is a 60 y o  male  09- Patient is here today for pre op RIH  Stated that he has had the hernia for  about 2 months and can feel a small lump  He did see his PCP after vacation because he thought he just had a groin strain the area  Colorectal screening was reviewed with the patient and he see's some down by [facility] MANOLO Salas       The following portions of the patient's history were reviewed and updated as appropriate: allergies, current medications, past family history, past medical history, past social history, past surgical history and problem list     Review of Systems   All other systems reviewed and are negative  Objective:      /72   Pulse 74   Temp 97 6 °F (36 4 °C) (Tympanic)   Resp 18   Ht 6' 1" (1 854 m)   Wt 75 3 kg (166 lb)   BMI 21 90 kg/m²          Physical Exam   Constitutional: He is oriented to person, place, and time  He appears well-developed and well-nourished  HENT:   Head: Normocephalic and atraumatic  Eyes: Pupils are equal, round, and reactive to light  Conjunctivae are normal    Neck: Normal range of motion  Neck supple  Cardiovascular: Normal rate and regular rhythm  Pulmonary/Chest: Effort normal and breath sounds normal    Abdominal: Soft  Bowel sounds are normal    Musculoskeletal: Normal range of motion  Neurological: He is alert and oriented to person, place, and time  Skin: Skin is warm and dry  Psychiatric: His behavior is normal  Judgment and thought content normal    Nursing note and vitals reviewed

## 2019-09-04 NOTE — LETTER
September 4, 2019     Hugh Allen MD  [facility] [id number]    Patient: Isaac Power   YOB: 1960   Date of Visit: 9/4/2019       Dear Dr Jennifer Guzmán: Thank you for referring Jair Flores to me for evaluation  Below are my notes for this consultation  If you have questions, please do not hesitate to call me  I look forward to following your patient along with you  Sincerely,        Renea Sunshine MD        CC: No Recipients  Renea Sunshine MD  9/4/2019  8:47 AM  Sign at close encounter  Assessment/Plan:    Recurrent right inguinal hernia  Patient is a pleasant 50-year-old male with past medical history significant for head neck carcinoma for a now presenting with a two-month history of intermittent right groin lump that is suspicious for recurrent right inguinal hernia for which Dr Jennifer Guzmán recommended a a general surgery consultation  Patient reports small amount of local discomfort associated with exertion  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  He reports of remote history of bilateral inguinal herniorrhaphy  Those repairs were performed without the use of mesh  On physical examination the patient is a well-appearing male  He is in no acute distress  He is a competent reliable historian  His abdomen is flat  On inspection there are no signs of umbilical or inguinal hernia bilaterally  On palpation I do not appreciate any obvious hernia in either the right or left groin  The testes are descended bilaterally  The patient provides a good history for an early recurrent right inguinal hernia  The physical examination is reassuring that he is safe  Given the normal physical examination surgery is not mandatory at the present time  We discussed 2 treatment options including observation and follow-up in 6 months  Or right groin exploration as an alternative  The patient was happy to avoid surgery at the present time    I look for to seeing him back in 6 months time or sooner on an as-needed basis  Diagnoses and all orders for this visit:    Recurrent right inguinal hernia          Subjective:      Patient ID: Milena Tate is a 60 y o  male  09- Patient is here today for pre op RIH  Stated that he has had the hernia for  about 2 months and can feel a small lump  He did see his PCP after vacation because he thought he just had a groin strain the area  Colorectal screening was reviewed with the patient and he see's some down by Bailey Cline       The following portions of the patient's history were reviewed and updated as appropriate: allergies, current medications, past family history, past medical history, past social history, past surgical history and problem list     Review of Systems   All other systems reviewed and are negative  Objective:      /72   Pulse 74   Temp 97 6 °F (36 4 °C) (Tympanic)   Resp 18   Ht 6' 1" (1 854 m)   Wt 75 3 kg (166 lb)   BMI 21 90 kg/m²           Physical Exam   Constitutional: He is oriented to person, place, and time  He appears well-developed and well-nourished  HENT:   Head: Normocephalic and atraumatic  Eyes: Pupils are equal, round, and reactive to light  Conjunctivae are normal    Neck: Normal range of motion  Neck supple  Cardiovascular: Normal rate and regular rhythm  Pulmonary/Chest: Effort normal and breath sounds normal    Abdominal: Soft  Bowel sounds are normal    Musculoskeletal: Normal range of motion  Neurological: He is alert and oriented to person, place, and time  Skin: Skin is warm and dry  Psychiatric: His behavior is normal  Judgment and thought content normal    Nursing note and vitals reviewed

## 2020-02-24 DIAGNOSIS — I10 BENIGN HYPERTENSION: ICD-10-CM

## 2020-02-24 RX ORDER — ATENOLOL 25 MG/1
25 TABLET ORAL 2 TIMES DAILY
Qty: 180 TABLET | Refills: 3 | Status: SHIPPED | OUTPATIENT
Start: 2020-02-24 | End: 2021-02-08

## 2020-03-02 ENCOUNTER — TELEPHONE (OUTPATIENT)
Dept: OTHER | Facility: OTHER | Age: 60
End: 2020-03-02

## 2020-03-02 NOTE — TELEPHONE ENCOUNTER
PT  Called in to cancel up coming appointment  PT states he is not having issues at the moment  Will call back if things change

## 2021-01-07 PROCEDURE — 88305 TISSUE EXAM BY PATHOLOGIST: CPT | Performed by: PATHOLOGY

## 2021-01-07 PROCEDURE — 88312 SPECIAL STAINS GROUP 1: CPT | Performed by: PATHOLOGY

## 2021-02-08 DIAGNOSIS — I10 BENIGN HYPERTENSION: ICD-10-CM

## 2021-02-08 RX ORDER — ATENOLOL 25 MG/1
TABLET ORAL
Qty: 180 TABLET | Refills: 3 | Status: SHIPPED | OUTPATIENT
Start: 2021-02-08 | End: 2022-02-14

## 2021-03-10 DIAGNOSIS — Z23 ENCOUNTER FOR IMMUNIZATION: ICD-10-CM

## 2021-03-17 ENCOUNTER — IMMUNIZATIONS (OUTPATIENT)
Dept: FAMILY MEDICINE CLINIC | Facility: HOSPITAL | Age: 61
End: 2021-03-17

## 2021-03-17 DIAGNOSIS — Z23 ENCOUNTER FOR IMMUNIZATION: Primary | ICD-10-CM

## 2021-03-17 PROCEDURE — 91301 SARS-COV-2 / COVID-19 MRNA VACCINE (MODERNA) 100 MCG: CPT

## 2021-03-17 PROCEDURE — 0011A SARS-COV-2 / COVID-19 MRNA VACCINE (MODERNA) 100 MCG: CPT

## 2021-03-18 ENCOUNTER — TRANSCRIBE ORDERS (OUTPATIENT)
Dept: ADMINISTRATIVE | Facility: HOSPITAL | Age: 61
End: 2021-03-18

## 2021-03-18 DIAGNOSIS — I71.2 THORACIC AORTIC ANEURYSM WITHOUT RUPTURE (HCC): Primary | ICD-10-CM

## 2021-04-14 ENCOUNTER — IMMUNIZATIONS (OUTPATIENT)
Dept: FAMILY MEDICINE CLINIC | Facility: HOSPITAL | Age: 61
End: 2021-04-14

## 2021-04-14 DIAGNOSIS — Z23 ENCOUNTER FOR IMMUNIZATION: Primary | ICD-10-CM

## 2021-04-14 PROCEDURE — 0012A SARS-COV-2 / COVID-19 MRNA VACCINE (MODERNA) 100 MCG: CPT

## 2021-04-14 PROCEDURE — 91301 SARS-COV-2 / COVID-19 MRNA VACCINE (MODERNA) 100 MCG: CPT

## 2021-04-27 ENCOUNTER — HOSPITAL ENCOUNTER (OUTPATIENT)
Dept: NON INVASIVE DIAGNOSTICS | Facility: HOSPITAL | Age: 61
Discharge: HOME | End: 2021-04-27
Attending: INTERNAL MEDICINE
Payer: COMMERCIAL

## 2021-04-27 DIAGNOSIS — I71.2 THORACIC AORTIC ANEURYSM WITHOUT RUPTURE (HCC): ICD-10-CM

## 2021-04-27 PROCEDURE — 93880 EXTRACRANIAL BILAT STUDY: CPT

## 2021-04-27 PROCEDURE — 93880 EXTRACRANIAL BILAT STUDY: CPT | Performed by: SURGERY

## 2021-04-28 DIAGNOSIS — I71.9 ANEURYSM OF AORTIC ROOT (HCC): ICD-10-CM

## 2021-04-28 DIAGNOSIS — Q23.1 BICUSPID AORTIC VALVE: Primary | ICD-10-CM

## 2021-06-15 ENCOUNTER — HOSPITAL ENCOUNTER (OUTPATIENT)
Dept: CT IMAGING | Facility: HOSPITAL | Age: 61
Discharge: HOME | End: 2021-06-15
Payer: COMMERCIAL

## 2021-06-15 ENCOUNTER — HOSPITAL ENCOUNTER (OUTPATIENT)
Dept: NON INVASIVE DIAGNOSTICS | Facility: HOSPITAL | Age: 61
Discharge: HOME | End: 2021-06-15
Payer: COMMERCIAL

## 2021-06-15 DIAGNOSIS — I71.9 ANEURYSM OF AORTIC ROOT (HCC): ICD-10-CM

## 2021-06-15 DIAGNOSIS — Q23.1 BICUSPID AORTIC VALVE: ICD-10-CM

## 2021-06-15 PROCEDURE — 93306 TTE W/DOPPLER COMPLETE: CPT | Performed by: INTERNAL MEDICINE

## 2021-06-15 PROCEDURE — 71250 CT THORAX DX C-: CPT

## 2021-06-15 PROCEDURE — 93306 TTE W/DOPPLER COMPLETE: CPT

## 2021-06-22 ENCOUNTER — OFFICE VISIT (OUTPATIENT)
Dept: CARDIOLOGY CLINIC | Facility: CLINIC | Age: 61
End: 2021-06-22
Payer: COMMERCIAL

## 2021-06-22 VITALS
HEART RATE: 72 BPM | DIASTOLIC BLOOD PRESSURE: 90 MMHG | BODY MASS INDEX: 22.93 KG/M2 | WEIGHT: 173 LBS | HEIGHT: 73 IN | SYSTOLIC BLOOD PRESSURE: 140 MMHG

## 2021-06-22 DIAGNOSIS — I10 ESSENTIAL HYPERTENSION: ICD-10-CM

## 2021-06-22 DIAGNOSIS — Q23.1 BICUSPID AORTIC VALVE: Primary | ICD-10-CM

## 2021-06-22 DIAGNOSIS — I71.9 ANEURYSM OF AORTIC ROOT (HCC): ICD-10-CM

## 2021-06-22 PROCEDURE — 93000 ELECTROCARDIOGRAM COMPLETE: CPT | Performed by: INTERNAL MEDICINE

## 2021-06-22 PROCEDURE — 99214 OFFICE O/P EST MOD 30 MIN: CPT | Performed by: INTERNAL MEDICINE

## 2021-06-22 RX ORDER — LISINOPRIL 10 MG/1
10 TABLET ORAL DAILY
Qty: 90 TABLET | Refills: 5 | Status: SHIPPED | OUTPATIENT
Start: 2021-06-22 | End: 2021-08-11 | Stop reason: SDUPTHER

## 2021-06-22 NOTE — PROGRESS NOTES
Patient ID: Pam Mandujano is a 63 y o  male  Plan:      Aneurysm of aortic root (HCC)  Stable at 4 5 cm  Will recheck by echo in 1 year  Bicuspid aortic valve  Only very mild stenosis  Essential hypertension  Borderline  Will add lisinopril  Follow up Plan/Other summary comments:  He will be seeing my surgical colleague next week  A CT of the chest is pending  Lisinopril is added  Echo EKG and follow-up visit in 1 year  HPI:  Patient is seen in follow-up regarding the above  Since last year's visit he has felt well  No change in exertional capacity  No chest pain or dyspnea  To reiterate the patient has a bicuspid aortic valve and associated aortic root aneurysm  This was discovered when he underwent surveillance imaging related to squamous cell carcinoma of the tonsil  Aortic root size is have remained in the 4 5-4 8 cm range for years  Results for orders placed or performed in visit on 06/22/21   POCT ECG    Impression    Sinus rhythm  Normal          Most recent or relevant cardiac/vascular testing:    TTE 07/02/2019: Aortic root to 4 5 cm  Normal LVEF  Mild aortic stenosis  TTE 06/15/2021:  No change    Past Surgical History:   Procedure Laterality Date    COLONOSCOPY      INGUINAL HERNIA REPAIR      TONSILLECTOMY      UMBILICAL HERNIA REPAIR       CMP:   Lab Results   Component Value Date    BUN 20 05/25/2018    CREATININE 1 13 05/25/2018    EGFR 71 05/25/2018       Lipid Profile: No results found for: CHOL, TRIG, HDL, LDL      Review of Systems   10  point ROS  was otherwise non pertinent or negative except as per HPI or as below  Gait: Normal         Objective:     /90   Pulse 72   Ht 6' 1" (1 854 m)   Wt 78 5 kg (173 lb)   BMI 22 82 kg/m²     PHYSICAL EXAM:    General:  Normal appearance in no distress  Eyes:  Anicteric  Oral mucosa:  Moist   Neck:  No JVD  Carotid upstrokes are brisk without bruits  No masses    Chest:  Clear to auscultation  Cardiac:  No palpable PMI  Normal S1 and S2  No murmur gallop or rub  Abdomen:  Soft and nontender  No palpable organomegaly or aortic enlargement  Extremities:  No peripheral edema  Musculoskeletal:  Symmetric  Vascular:  Femoral pulses are brisk without bruits  Popliteal pulses are intact bilaterally  Pedal pulses are intact  Neuro:  Grossly symmetric  Psych:  Alert and oriented x3  Current Outpatient Medications:     ASPIRIN 81 PO, Chew 81mg tablet 5 days a week, Disp: , Rfl:     atenolol (TENORMIN) 25 mg tablet, TAKE 1 TABLET BY MOUTH TWICE A DAY, Disp: 180 tablet, Rfl: 3    atorvastatin (LIPITOR) 20 mg tablet, , Disp: , Rfl:     diclofenac (VOLTAREN) 75 mg EC tablet, Take 75 mg by mouth as needed, Disp: , Rfl:     ibuprofen (MOTRIN) 600 mg tablet, Take 600 mg by mouth as needed, Disp: , Rfl:     levothyroxine 88 mcg tablet, Take 1 tablet by mouth daily, Disp: , Rfl: 0    lisinopril (ZESTRIL) 10 mg tablet, Take 1 tablet (10 mg total) by mouth daily, Disp: 90 tablet, Rfl: 5  No Known Allergies  Past Medical History:   Diagnosis Date    Aneurys[address] of aortic root ([facility] [address] )     Bicuspid aortic valve     H/O hyperthyroidism     History of diverticulitis     History of echocardiogram 2018    EF 65%, bicuspid AV with superior-inferior irientation, mild AS, mild MR, trace TR, mildly dilated ascending aorta (sinus of valsalva is 4 2-cm)    History of malignant neoplasm of tonsil     oral pharyngeal carcinoma stage III    Status post chemoradiation completed 9/10/2010    History of rectal fissure     Hypertension     Hypothyroidism            Social History     Tobacco Use   Smoking Status Former Smoker    Packs/day: 0 50    Years: 20 00    Pack years: 10 00    Types: Cigarettes    Quit date: 0    Years since quittin 4   Smokeless Tobacco Current User    Types: Chew   Tobacco Comment    on/off X about 20 years

## 2021-06-24 ENCOUNTER — OFFICE VISIT (OUTPATIENT)
Dept: CARDIAC SURGERY | Facility: CLINIC | Age: 61
End: 2021-06-24
Payer: COMMERCIAL

## 2021-06-24 VITALS
WEIGHT: 175.7 LBS | OXYGEN SATURATION: 96 % | HEART RATE: 84 BPM | DIASTOLIC BLOOD PRESSURE: 74 MMHG | TEMPERATURE: 97.5 F | RESPIRATION RATE: 18 BRPM | HEIGHT: 73 IN | SYSTOLIC BLOOD PRESSURE: 90 MMHG | BODY MASS INDEX: 23.29 KG/M2

## 2021-06-24 DIAGNOSIS — Q23.1 BICUSPID AORTIC VALVE: ICD-10-CM

## 2021-06-24 DIAGNOSIS — I71.9 ANEURYSM OF AORTIC ROOT (HCC): Primary | ICD-10-CM

## 2021-06-24 PROCEDURE — 3008F BODY MASS INDEX DOCD: CPT | Performed by: NURSE PRACTITIONER

## 2021-06-24 PROCEDURE — 99215 OFFICE O/P EST HI 40 MIN: CPT | Performed by: NURSE PRACTITIONER

## 2021-06-24 PROCEDURE — 1036F TOBACCO NON-USER: CPT | Performed by: NURSE PRACTITIONER

## 2021-06-24 NOTE — LETTER
June 24, 2021     Willy Grace MD  [facility] [address]    Patient: Pam Mandujano   YOB: 1960   Date of Visit: 6/24/2021       Dear Dr Loyd Flores: Thank you for referring Stuart Delacruz to me for evaluation  Below are my notes for this consultation  If you have questions, please do not hesitate to call me  I look forward to following your patient along with you  Sincerely,        Fady Sharma MD        CC: MD Yang Santoyo CRNP  6/24/2021  3:28 PM  Attested  Aortic Clinic  Pam Mandujano 63 y o  male MRN: [id number]    PCP: Willy Grace MD  Cardiology: Francis Martinez MD    Reason for Consult / Principal Problem: Bicuspid aortic valve, Aortic root aneurysm    History of Present Illness: Pam Mandujano is a 63y o  year old male who presents today for ongoing surveillance of his bicuspid aortic valve and aortic root aneurysm  This was initially identified in 2011 when he underwent surveillance imaging after treatment for 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 (2010)  At that time (2011) his aortic root aneurysm measured 48 mm at the SOV and his BAV demonstrated mild AS  He has been followed in our aortic clinic since 2016 and his imaging has remained stable  Pam Mandujano was most recently evaluated in our office in July 2019 at which time his ascending aorta measured 36 mm, aortic root measured 45 mm and echo demonstrated stable appearance of the BAV with mild AS  He returns today for a 2 year follow up with repeat imaging  Upon interview today patient states he is doing well  He continues to work full time as a   He reports lightheadedness mostly with position changes bit since starting Lisinopril earlier this weak, prescribed by his cardiologist, he has noted a bit more lightheadedness  He denies presyncope or syncope  He reports a mild left anterior chest "pain" that occurs randomly and is not new   He denies SOB, MORENO, change in activity tolerance, weight gain or edema  Past Medical History:  Past Medical History:   Diagnosis Date    Aneurysm of aortic root ([address] )     Bicuspid aortic valve     H/O hyperthyroidism     History of diverticulitis     History of echocardiogram 2018    EF 65%, bicuspid AV with superior-inferior irientation, mild AS, mild MR, trace TR, mildly dilated ascending aorta (sinus of valsalva is 4 2-cm)    History of malignant neoplasm of tonsil     oral pharyngeal carcinoma stage III  Status post chemoradiation completed 9/10/2010    History of rectal fissure     Hypertension     Hypothyroidism          Past Surgical History:   Past Surgical History:   Procedure Laterality Date    COLONOSCOPY      INGUINAL HERNIA REPAIR      TONSILLECTOMY      UMBILICAL HERNIA REPAIR           Family History:  Family History   Problem Relation Age of Onset   [de-identified] Dementia Mother     Colon cancer Mother    [de-identified] Breast cancer Mother     Other Family         Sarcoma    Other Father          Social History:    Social History     Substance and Sexual Activity   Alcohol Use No     Social History     Substance and Sexual Activity   Drug Use No     Social History     Tobacco Use   Smoking Status Former Smoker    Packs/day: 0 50    Years: 20 00    Pack years: 10 00    Types: Cigarettes    Quit date: 0    Years since quittin 4   Smokeless Tobacco Current User    Types: Chew   Tobacco Comment    on/off X about 20 years         Home Medications:   Prior to Admission medications    Medication Sig Start Date End Date Taking?  Authorizing Provider   ASPIRIN 81 PO Chew 81mg tablet 5 days a week   Yes Historical Provider, MD   atenolol (TENORMIN) 25 mg tablet TAKE 1 TABLET BY MOUTH TWICE A DAY 21  Yes Negro Trujillo PA-C   atorvastatin (LIPITOR) 20 mg tablet  21  Yes Historical Provider, MD   diclofenac (VOLTAREN) 75 mg EC tablet Take 75 mg by mouth as needed   Yes Historical Provider, MD   ibuprofen (MOTRIN) 600 mg tablet Take 600 mg by mouth as needed 5/6/17  Yes Historical Provider, MD   levothyroxine 88 mcg tablet Take 1 tablet by mouth daily 6/5/18  Yes Historical Provider, MD   lisinopril (ZESTRIL) 10 mg tablet Take 1 tablet (10 mg total) by mouth daily 6/22/21  Yes Bindu Enriquez MD       Allergies:  No Known Allergies    Review of Systems:   Review of Systems - History obtained from chart review and the patient  General ROS: negative  Psychological ROS: negative  Ophthalmic ROS: negative  ENT ROS: negative  Allergy and Immunology ROS: negative  Hematological and Lymphatic ROS: negative  Endocrine ROS: negative  Respiratory ROS: no cough, shortness of breath, or wheezing  Cardiovascular ROS: no chest pain or dyspnea on exertion  Gastrointestinal ROS: no abdominal pain, change in bowel habits, or black or bloody stools  Genito-Urinary ROS: no dysuria, trouble voiding, or hematuria  Musculoskeletal ROS: negative  Neurological ROS: positive for - lightheadedness  negative for - presyncope/syncope  Dermatological ROS: negative    Vital Signs:   Vitals:    06/24/21 1440 06/24/21 1443   BP: 102/70 90/74   BP Location: Left arm Right arm   Patient Position: Sitting    Cuff Size: Standard    Pulse: 84    Resp: 18    Temp: 97 5 °F (36 4 °C)    TempSrc: Tympanic    SpO2: 96%    Weight: 79 7 kg (175 lb 11 2 oz)    Height: 6' 1" (1 854 m)        Physical Exam:  General: Alert, oriented, well developed, no acute distress  HEENT/NECK:  PERRLA  No jugular venous distention  Cardiac:Regular rate and rhythm, No murmurs rubs or gallops  Carotid arteries: 2+ pulses, no bruits  Pulmonary:  Breath sounds clear bilaterally  Abdomen:  Non-tender, Non-distended  Positive bowel sounds  Upper extremities: 2+ radial pulses; brisk capillary refill  Lower extremities: Extremities warm/dry  PT/DP pulses 2+ bilaterally  No edema B/L  Neuro: Alert and oriented X 3  Sensation is grossly intact    No focal deficits  Musculoskeletal: MAEE, no deficits, stable gait  Skin: Warm/Dry, without rashes or lesions  Lab Results:   3/11/21  8:13 AM     Glucose 65 - 99 mg/dL 85        BUN 7 - 28 mg/dL 21        Creatinine 0 53 - 1 30 mg/dL 1 04        Sodium 135 - 145 mmol/L 140        Potassium 3 5 - 5 2 mmol/L 5 4High         Chloride 100 - 109 mmol/L 103        Carbon Dioxide 23 - 31 mmol/L 30        Calcium 8 5 - 10 1 mg/dL 9 9        Alkaline Phosphatase 35 - 120 U/L 60        Albumin 3 5 - 4 8 g/dL 4 2        Bilirubin, Total 0 2 - 1 0 mg/dL 0 5    Comment: Use of this assay is not recommended for patients undergoing treatment with eltrombopag due to the potential for falsely elevated results  Protein, Total 6 3 - 8 3 g/dL 7 1        AST <41 U/L 22        ALT <56 U/L 24        Anion Gap 3 - 11  7        eGFR, Non- >60  77        eGFR,  >60  89        Imaging Studies:     CT Chest: 6/15/21  Unchanged diameter of the aortic root (4 4 cm) and ascending thoracic aorta (3 7 cm)  Echocardiogram: 6/15/21  LEFT VENTRICLE:  Systolic function was normal  Ejection fraction was estimated to be 60 %  There were no regional wall motion abnormalities      AORTIC VALVE:  The valve was bicuspid  Leaflets exhibited normal thickness and normal cuspal separation  There was very mild stenosis  I have personally reviewed pertinent films in PACS    Assessment:  Patient Active Problem List    Diagnosis Date Noted    Recurrent right inguinal hernia 09/04/2019    Essential hypertension 02/13/2019    Bicuspid aortic valve 08/18/2016    Aneurysm of aortic root ([address] ) 08/03/2016         Impression/Plan:    CT and Echo imaging performed prior to this visit demonstrates the aortic root  measuring 45 mm in size at its greatest diameter and BAV with mild AS  These findings remain stable and unchanged  These findings were confirmed and shared with the patient today        Patient does not meet surgical criteria at this point:  Ascending aortic aneurysm surgical triggers:  * 4 5 cm or > in the setting of + FH of rupture or dissection  * 5 cm with moderate or worse aortic valve disease  *  5 5 cm regardless of aortic valve function and without genetically associated aortic disease   *  Aortic growth > 4mm / year  *  Bicuspid aortic valve with valve dysfunction enough to meet indications for surgery and concomitant ascending aortic aneurysm 4 5 cm or >  * 4 5 cm or > in setting of existing connective tissue disease of Marfan's syndrome, Christiano-Danlos syndrome or familiar aneurysms syndrome      Continue with routine surveillance with non-contrast CT scan and echocardiogram in 2 years  Kaitlin Lemus was comfortable with our recommendations, and their questions were answered to their satisfaction  Aortic Aneurysm Instructions were provided to the patient as follows:    1  No lifting more than 50 pounds  Regular aerobic exercise permitted and recommended  2  Maintain a controlled blood pressure with a goal of less than 140/80  3  Follow up in Aortic Clinic as recommended with radiology follow up as instructed  4  Report to the ER or call 911 immediately with the following signs / symptoms: sudden onset of back pain, chest pain or shortness of breath  SIGNATURE: RAVEN Crawford  DATE: June 24, 2021  TIME: 3:08 PM  Attestation signed by Mirian Villa MD at 6/24/2021  3:30 PM:  Patient seen and evaluated with 10 Avelina Garcia / ALEXANDRA  I agree with the above assessment and plan with the following additions  The patient is a 67-year-old man with history of bicuspid aortic valve and aortic root aneurysm, I have been following him him are aortic clinic since 2016, he is here today for a 2 year follow-up  He is asymptomatic    CT of the chest shows unstable ascending aorta with a maximum diameter of 3 6 cm, transthoracic echocardiogram shows bicuspid aortic valve with mild aortic stenosis, the aortic root remained stable at 4 5 cm  I have reviewed his diagnosis once again, there are no indications for surgery at this point, I would like to see him back in 2 years with a CT of the chest and the transthoracic echocardiogram     This note was completed in part utilizing [employer] direct voice recognition software  Grammatical errors, random word insertion, spelling mistakes, and incomplete sentences may be an occasional consequence of the system secondary to software limitations, ambient noise and hardware issues  At the time of dictation, efforts were made to edit, clarify and /or correct errors  Please read the chart carefully and recognize, using context, where substitutions have occurred  If you have any questions or concerns about the context, text or information contained within the body of this dictation, please contact myself, the provider, for further clarification

## 2021-06-24 NOTE — PROGRESS NOTES
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)  At that time (2011) his aortic root aneurysm measured 48 mm at the SOV and his BAV demonstrated mild AS  He has been followed in our aortic clinic since 2016 and his imaging has remained stable  Adis Giraldo was most recently evaluated in our office in July 2019 at which time his ascending aorta measured 36 mm, aortic root measured 45 mm and echo demonstrated stable appearance of the BAV with mild AS  He returns today for a 2 year follow up with repeat imaging  Upon interview today patient states he is doing well  He continues to work full time as a   He reports lightheadedness mostly with position changes bit since starting Lisinopril earlier this weak, prescribed by his cardiologist, he has noted a bit more lightheadedness  He denies presyncope or syncope  He reports a mild left anterior chest "pain" that occurs randomly and is not new  He denies SOB, MORENO, change in activity tolerance, weight gain or edema  Past Medical History:  Past Medical History:   Diagnosis Date    Aneurysm of aortic root ([address] )     Bicuspid aortic valve     H/O hyperthyroidism     History of diverticulitis     History of echocardiogram 05/22/2018    EF 65%, bicuspid AV with superior-inferior irientation, mild AS, mild MR, trace TR, mildly dilated ascending aorta (sinus of valsalva is 4 2-cm)    History of malignant neoplasm of tonsil     oral pharyngeal carcinoma stage III    Status post chemoradiation completed 9/10/2010    History of rectal fissure     Hypertension     Hypothyroidism          Past Surgical History:   Past Surgical History:   Procedure Laterality Date    COLONOSCOPY      INGUINAL HERNIA REPAIR      TONSILLECTOMY      UMBILICAL HERNIA REPAIR           Family History:  Family History   Problem Relation Age of Onset   Zuhair Pert Dementia Mother     Colon cancer Mother    Zuhair Pert Breast cancer Mother     Other Family         Sarcoma    Other Father          Social History:    Social History     Substance and Sexual Activity   Alcohol Use No     Social History     Substance and Sexual Activity   Drug Use No     Social History     Tobacco Use   Smoking Status Former Smoker    Packs/day: 0 50    Years: 20 00    Pack years: 10 00    Types: Cigarettes    Quit date: 0    Years since quittin 4   Smokeless Tobacco Current User    Types: Chew   Tobacco Comment    on/off X about 20 years         Home Medications:   Prior to Admission medications    Medication Sig Start Date End Date Taking?  Authorizing Provider   ASPIRIN 81 PO Chew 81mg tablet 5 days a week   Yes Historical Provider, MD   atenolol (TENORMIN) 25 mg tablet TAKE 1 TABLET BY MOUTH TWICE A DAY 21  Yes Tammie Amor PA-C   atorvastatin (LIPITOR) 20 mg tablet  21  Yes Historical Provider, MD   diclofenac (VOLTAREN) 75 mg EC tablet Take 75 mg by mouth as needed   Yes Historical Provider, MD   ibuprofen (MOTRIN) 600 mg tablet Take 600 mg by mouth as needed 17  Yes Historical Provider, MD   levothyroxine 88 mcg tablet Take 1 tablet by mouth daily 18  Yes Historical Provider, MD   lisinopril (ZESTRIL) 10 mg tablet Take 1 tablet (10 mg total) by mouth daily 21  Yes Alexus Bills MD       Allergies:  No Known Allergies    Review of Systems:   Review of Systems - History obtained from chart review and the patient  General ROS: negative  Psychological ROS: negative  Ophthalmic ROS: negative  ENT ROS: negative  Allergy and Immunology ROS: negative  Hematological and Lymphatic ROS: negative  Endocrine ROS: negative  Respiratory ROS: no cough, shortness of breath, or wheezing  Cardiovascular ROS: no chest pain or dyspnea on exertion  Gastrointestinal ROS: no abdominal pain, change in bowel habits, or black or bloody stools  Genito-Urinary ROS: no dysuria, trouble voiding, or hematuria  Musculoskeletal ROS: negative  Neurological ROS: positive for - lightheadedness  negative for - presyncope/syncope  Dermatological ROS: negative    Vital Signs:   Vitals:    06/24/21 1440 06/24/21 1443   BP: 102/70 90/74   BP Location: Left arm Right arm   Patient Position: Sitting    Cuff Size: Standard    Pulse: 84    Resp: 18    Temp: 97 5 °F (36 4 °C)    TempSrc: Tympanic    SpO2: 96%    Weight: 79 7 kg (175 lb 11 2 oz)    Height: 6' 1" (1 854 m)        Physical Exam:  General: Alert, oriented, well developed, no acute distress  HEENT/NECK:  PERRLA  No jugular venous distention  Cardiac:Regular rate and rhythm, No murmurs rubs or gallops  Carotid arteries: 2+ pulses, no bruits  Pulmonary:  Breath sounds clear bilaterally  Abdomen:  Non-tender, Non-distended  Positive bowel sounds  Upper extremities: 2+ radial pulses; brisk capillary refill  Lower extremities: Extremities warm/dry  PT/DP pulses 2+ bilaterally  No edema B/L  Neuro: Alert and oriented X 3  Sensation is grossly intact  No focal deficits  Musculoskeletal: MAEE, no deficits, stable gait  Skin: Warm/Dry, without rashes or lesions      Lab Results:   3/11/21  8:13 AM     Glucose 65 - 99 mg/dL 85        BUN 7 - 28 mg/dL 21        Creatinine 0 53 - 1 30 mg/dL 1 04        Sodium 135 - 145 mmol/L 140        Potassium 3 5 - 5 2 mmol/L 5 4High         Chloride 100 - 109 mmol/L 103        Carbon Dioxide 23 - 31 mmol/L 30        Calcium 8 5 - 10 1 mg/dL 9 9        Alkaline Phosphatase 35 - 120 U/L 60        Albumin 3 5 - 4 8 g/dL 4 2        Bilirubin, Total 0 2 - 1 0 mg/dL 0 5    Comment: Use of this assay is not recommended for patients undergoing treatment with eltrombopag due to the potential for falsely elevated results  Protein, Total 6 3 - 8 3 g/dL 7 1        AST <41 U/L 22        ALT <56 U/L 24        Anion Gap 3 - 11  7        eGFR, Non- >60  77        eGFR,  >60  89        Imaging Studies:     CT Chest: 6/15/21  Unchanged diameter of the aortic root (4 4 cm) and ascending thoracic aorta (3 7 cm)  Echocardiogram: 6/15/21  LEFT VENTRICLE:  Systolic function was normal  Ejection fraction was estimated to be 60 %  There were no regional wall motion abnormalities      AORTIC VALVE:  The valve was bicuspid  Leaflets exhibited normal thickness and normal cuspal separation  There was very mild stenosis  I have personally reviewed pertinent films in PACS    Assessment:  Patient Active Problem List    Diagnosis Date Noted    Recurrent right inguinal hernia 09/04/2019    Essential hypertension 02/13/2019    Bicuspid aortic valve 08/18/2016    Aneurysm of aortic root ([address] ) 08/03/2016         Impression/Plan:    CT and Echo imaging performed prior to this visit demonstrates the aortic root  measuring 45 mm in size at its greatest diameter and BAV with mild AS  These findings remain stable and unchanged  These findings were confirmed and shared with the patient today        Patient does not meet surgical criteria at this point:  Ascending aortic aneurysm surgical triggers:  * 4 5 cm or > in the setting of + FH of rupture or dissection  * 5 cm with moderate or worse aortic valve disease  *  5 5 cm regardless of aortic valve function and without genetically associated aortic disease   *  Aortic growth > 4mm / year  *  Bicuspid aortic valve with valve dysfunction enough to meet indications for surgery and concomitant ascending aortic aneurysm 4 5 cm or >  * 4 5 cm or > in setting of existing connective tissue disease of Marfan's syndrome, Christiano-Danlos syndrome or familiar aneurysms syndrome      Continue with routine surveillance with non-contrast CT scan and echocardiogram in 2 years  Digna Crocker was comfortable with our recommendations, and their questions were answered to their satisfaction  Aortic Aneurysm Instructions were provided to the patient as follows:    1  No lifting more than 50 pounds  Regular aerobic exercise permitted and recommended  2  Maintain a controlled blood pressure with a goal of less than 140/80  3  Follow up in Aortic Clinic as recommended with radiology follow up as instructed  4  Report to the ER or call 911 immediately with the following signs / symptoms: sudden onset of back pain, chest pain or shortness of breath        SIGNATURE: RAVEN Benitez  DATE: June 24, 2021  TIME: 3:08 PM

## 2021-08-11 DIAGNOSIS — I10 ESSENTIAL HYPERTENSION: ICD-10-CM

## 2021-08-11 DIAGNOSIS — I71.9 ANEURYSM OF AORTIC ROOT (HCC): ICD-10-CM

## 2021-08-11 RX ORDER — LISINOPRIL 10 MG/1
10 TABLET ORAL DAILY
Qty: 90 TABLET | Refills: 3 | Status: SHIPPED | OUTPATIENT
Start: 2021-08-11 | End: 2022-02-15 | Stop reason: SDUPTHER

## 2022-02-12 DIAGNOSIS — I10 BENIGN HYPERTENSION: ICD-10-CM

## 2022-02-14 RX ORDER — ATENOLOL 25 MG/1
TABLET ORAL
Qty: 180 TABLET | Refills: 3 | Status: SHIPPED | OUTPATIENT
Start: 2022-02-14 | End: 2022-02-15 | Stop reason: SDUPTHER

## 2022-02-15 DIAGNOSIS — I10 ESSENTIAL HYPERTENSION: ICD-10-CM

## 2022-02-15 DIAGNOSIS — I71.9 ANEURYSM OF AORTIC ROOT (HCC): ICD-10-CM

## 2022-02-15 DIAGNOSIS — I10 BENIGN HYPERTENSION: ICD-10-CM

## 2022-02-15 RX ORDER — LISINOPRIL 10 MG/1
10 TABLET ORAL DAILY
Qty: 90 TABLET | Refills: 3 | Status: SHIPPED | OUTPATIENT
Start: 2022-02-15

## 2022-02-15 RX ORDER — ATENOLOL 25 MG/1
25 TABLET ORAL 2 TIMES DAILY
Qty: 180 TABLET | Refills: 3 | Status: SHIPPED | OUTPATIENT
Start: 2022-02-15 | End: 2022-07-14 | Stop reason: SDUPTHER

## 2022-06-20 ENCOUNTER — HOSPITAL ENCOUNTER (OUTPATIENT)
Dept: NON INVASIVE DIAGNOSTICS | Facility: HOSPITAL | Age: 62
Discharge: HOME | End: 2022-06-20
Attending: INTERNAL MEDICINE
Payer: COMMERCIAL

## 2022-06-20 VITALS
WEIGHT: 175.71 LBS | HEIGHT: 73 IN | BODY MASS INDEX: 23.29 KG/M2 | DIASTOLIC BLOOD PRESSURE: 74 MMHG | SYSTOLIC BLOOD PRESSURE: 116 MMHG | HEART RATE: 72 BPM

## 2022-06-20 DIAGNOSIS — Q23.1 BICUSPID AORTIC VALVE: ICD-10-CM

## 2022-06-20 DIAGNOSIS — I10 ESSENTIAL HYPERTENSION: ICD-10-CM

## 2022-06-20 DIAGNOSIS — I71.9 ANEURYSM OF AORTIC ROOT (HCC): ICD-10-CM

## 2022-06-20 LAB
AORTIC ROOT: 4.9 CM
AORTIC VALVE MEAN VELOCITY: 17.1 M/S
ASCENDING AORTA: 4.7 CM
AV AREA BY CONTINUOUS VTI: 1 CM2
AV AREA PEAK VELOCITY: 1 CM2
AV LVOT MEAN GRADIENT: 1 MMHG
AV LVOT PEAK GRADIENT: 2 MMHG
AV MEAN GRADIENT: 13 MMHG
AV PEAK GRADIENT: 23 MMHG
AV VALVE AREA: 1.03 CM2
AV VELOCITY RATIO: 0.27
DOP CALC AO PEAK VEL: 2.3 M/S
DOP CALC AO VTI: 46.21 CM
DOP CALC LVOT AREA: 3.8 CM2
DOP CALC LVOT DIAMETER: 2.2 CM
DOP CALC LVOT PEAK VEL VTI: 12.51 CM
DOP CALC LVOT PEAK VEL: 0.61 M/S
DOP CALC LVOT STROKE INDEX: 23 ML/M2
DOP CALC LVOT STROKE VOLUME: 47.53 CM3
E WAVE DECELERATION TIME: 231 MS
FRACTIONAL SHORTENING: 29 % (ref 28–44)
INTERVENTRICULAR SEPTUM IN DIASTOLE (PARASTERNAL SHORT AXIS VIEW): 0.8 CM
INTERVENTRICULAR SEPTUM: 0.8 CM (ref 0.6–1.1)
LAAS-AP4: 9.6 CM2
LEFT ATRIUM SIZE: 2.4 CM
LEFT INTERNAL DIMENSION IN SYSTOLE: 3.6 CM (ref 2.1–4)
LEFT VENTRICULAR INTERNAL DIMENSION IN DIASTOLE: 5.1 CM (ref 3.5–6)
LEFT VENTRICULAR POSTERIOR WALL IN END DIASTOLE: 0.7 CM
LEFT VENTRICULAR STROKE VOLUME: 70 ML
LVSV (TEICH): 70 ML
MV E'TISSUE VEL-LAT: 9 CM/S
MV E'TISSUE VEL-SEP: 9 CM/S
MV PEAK A VEL: 0.71 M/S
MV PEAK E VEL: 51 CM/S
MV STENOSIS PRESSURE HALF TIME: 67 MS
MV VALVE AREA P 1/2 METHOD: 3.28 CM2
PULMONARY REGURGITATION LATE DIASTOLIC VELOCITY: 0.01 M/S
RIGHT VENTRICLE ID DIMENSION: 2.9 CM
SL CV LV EF: 60
SL CV PED ECHO LEFT VENTRICLE DIASTOLIC VOLUME (MOD BIPLANE) 2D: 123 ML
SL CV PED ECHO LEFT VENTRICLE SYSTOLIC VOLUME (MOD BIPLANE) 2D: 54 ML
TR MAX PG: 18 MMHG
TR PEAK VELOCITY: 2.1 M/S
TRICUSPID VALVE PEAK E WAVE VELOCITY: 0.11 M/S
TRICUSPID VALVE PEAK REGURGITATION VELOCITY: 2.11 M/S

## 2022-06-20 PROCEDURE — 93306 TTE W/DOPPLER COMPLETE: CPT

## 2022-06-20 PROCEDURE — 93306 TTE W/DOPPLER COMPLETE: CPT | Performed by: INTERNAL MEDICINE

## 2022-07-14 DIAGNOSIS — I10 BENIGN HYPERTENSION: ICD-10-CM

## 2022-07-14 RX ORDER — ATENOLOL 25 MG/1
25 TABLET ORAL 2 TIMES DAILY
Qty: 180 TABLET | Refills: 3 | Status: SHIPPED | OUTPATIENT
Start: 2022-07-14

## 2022-08-02 ENCOUNTER — OFFICE VISIT (OUTPATIENT)
Dept: CARDIOLOGY CLINIC | Facility: CLINIC | Age: 62
End: 2022-08-02
Payer: COMMERCIAL

## 2022-08-02 VITALS
BODY MASS INDEX: 22.26 KG/M2 | HEART RATE: 65 BPM | WEIGHT: 168 LBS | DIASTOLIC BLOOD PRESSURE: 72 MMHG | SYSTOLIC BLOOD PRESSURE: 110 MMHG | HEIGHT: 73 IN

## 2022-08-02 DIAGNOSIS — I71.21 ANEURYSM OF AORTIC ROOT: ICD-10-CM

## 2022-08-02 DIAGNOSIS — I10 ESSENTIAL HYPERTENSION: ICD-10-CM

## 2022-08-02 DIAGNOSIS — Q23.1 AORTIC STENOSIS DUE TO BICUSPID AORTIC VALVE: ICD-10-CM

## 2022-08-02 DIAGNOSIS — Q23.0 AORTIC STENOSIS DUE TO BICUSPID AORTIC VALVE: ICD-10-CM

## 2022-08-02 DIAGNOSIS — Q23.1 BICUSPID AORTIC VALVE: Primary | ICD-10-CM

## 2022-08-02 PROBLEM — I35.0 AORTIC STENOSIS DUE TO BICUSPID AORTIC VALVE: Status: ACTIVE | Noted: 2022-08-02

## 2022-08-02 PROBLEM — Q23.81 AORTIC STENOSIS DUE TO BICUSPID AORTIC VALVE: Status: ACTIVE | Noted: 2022-08-02

## 2022-08-02 PROCEDURE — 99214 OFFICE O/P EST MOD 30 MIN: CPT | Performed by: INTERNAL MEDICINE

## 2022-08-02 PROCEDURE — 93000 ELECTROCARDIOGRAM COMPLETE: CPT | Performed by: INTERNAL MEDICINE

## 2022-08-02 RX ORDER — TAMSULOSIN HYDROCHLORIDE 0.4 MG/1
0.4 CAPSULE ORAL DAILY
COMMUNITY
Start: 2022-07-21

## 2022-08-02 NOTE — PROGRESS NOTES
Patient ID: Bernardino Santana is a 57 y o  male  Plan:      Aneurysm of aortic root ([address] )  A little bit bigger by echo  Will confer with thoracic surgeon  Bicuspid aortic valve  Mild aortic stenosis  Essential hypertension  Well controlled  Aortic stenosis due to bicuspid aortic valve  Mild stenosis  Follow up Plan/Other summary comments:  F/u ecg, echo and f/u after next summer  Will message surgery about mild increase in aortic parameters  HPI:   Patient seen in f/u regarding the above issues  In general patient has felt well since last visit  No change in exertional capacity  Certainly no chest pain or chest pressure  Blood pressure has been much better since lisinopril was started last year  To reiterate the patient has a bicuspid aortic valve and associated aortic root aneurysm  This was discovered when he underwent surveillance imaging related to squamous cell carcinoma of the tonsil  Aortic root size is have remained in the 4 5-4 8 cm range for years  Results for orders placed or performed in visit on 08/02/22   POCT ECG    Impression    NSR  Single PVC  Otherwise WNL  Most recent or relevant cardiac/vascular testing:       TTE 07/02/2019: Aortic root to 4 5 cm  Normal LVEF  Mild aortic stenosis  TTE 06/15/2021:  No change  TTE 06/20/2022:  Very mild aortic stenosis  Normal LVEF  Aortic root 4 9 cm ascending aorta 4 7 cm  Past Surgical History:   Procedure Laterality Date    COLONOSCOPY      INGUINAL HERNIA REPAIR      TONSILLECTOMY      UMBILICAL HERNIA REPAIR       CMP:   Lab Results   Component Value Date    BUN 20 05/25/2018    CREATININE 1 13 05/25/2018    EGFR 71 05/25/2018       Lipid Profile: No results found for: CHOL, TRIG, HDL, LDL      Review of Systems   10  point ROS  was otherwise non pertinent or negative except as per HPI or as below     Gait: Normal          Objective:     /72   Pulse 65   Ht 6' 1" (1 854 m)   Wt 76 2 kg (168 lb)   BMI 22 16 kg/m²     PHYSICAL EXAM:    General:  Normal appearance in no distress  Eyes:  Anicteric  Oral mucosa:  Moist   Neck:  No JVD  Carotid upstrokes are brisk without bruits  No masses  Chest:  Clear to auscultation  Cardiac:  No palpable PMI  Normal S1 and S2   Grade 1-2 systolic murmur at the base  No gallop or rub  Abdomen:  Soft and nontender  No palpable organomegaly or aortic enlargement  Extremities:  No peripheral edema  Musculoskeletal:  Symmetric  Vascular:  Femoral pulses are brisk without bruits  Popliteal pulses are intact bilaterally  Pedal pulses are intact  Neuro:  Grossly symmetric  Psych:  Alert and oriented x3  Current Outpatient Medications:     ASPIRIN 81 PO, Chew 81mg tablet 5 days a week, Disp: , Rfl:     atenolol (TENORMIN) 25 mg tablet, Take 1 tablet (25 mg total) by mouth 2 (two) times a day (Patient taking differently: Patient takes 1 tablet in the morning and 1/2 tablet in the evening ), Disp: 180 tablet, Rfl: 3    atorvastatin (LIPITOR) 20 mg tablet, , Disp: , Rfl:     diclofenac (VOLTAREN) 75 mg EC tablet, Take 75 mg by mouth as needed, Disp: , Rfl:     ibuprofen (MOTRIN) 600 mg tablet, Take 600 mg by mouth as needed, Disp: , Rfl:     levothyroxine 88 mcg tablet, Take 1 tablet by mouth daily, Disp: , Rfl: 0    lisinopril (ZESTRIL) 10 mg tablet, Take 1 tablet (10 mg total) by mouth daily, Disp: 90 tablet, Rfl: 3    tamsulosin (FLOMAX) 0 4 mg, Take 0 4 mg by mouth daily, Disp: , Rfl:   No Known Allergies  Past Medical History:   Diagnosis Date    Aneurysm of aortic root ([address] )     Bicuspid aortic valve     H/O hyperthyroidism     History of diverticulitis     History of echocardiogram 05/22/2018    EF 65%, bicuspid AV with superior-inferior irientation, mild AS, mild MR, trace TR, mildly dilated ascending aorta (sinus of valsalva is 4 2-cm)    History of malignant neoplasm of tonsil     oral pharyngeal carcinoma stage III    Status post chemoradiation completed 9/10/2010    History of rectal fissure     Hypertension     Hypothyroidism            Social History     Tobacco Use   Smoking Status Former Smoker    Packs/day: 0 50    Years: 20 00    Pack years: 10 00    Types: Cigarettes    Quit date: 0    Years since quittin 6   Smokeless Tobacco Current User    Types: Chew   Tobacco Comment    on/off X about 20 years

## 2022-08-03 DIAGNOSIS — I71.21 ANEURYSM OF AORTIC ROOT: Primary | ICD-10-CM

## 2023-02-02 ENCOUNTER — TELEPHONE (OUTPATIENT)
Dept: RADIOLOGY | Facility: HOSPITAL | Age: 63
End: 2023-02-02

## 2023-02-02 ENCOUNTER — APPOINTMENT (OUTPATIENT)
Dept: LAB | Facility: CLINIC | Age: 63
End: 2023-02-02

## 2023-02-02 DIAGNOSIS — I71.21 ANEURYSM OF AORTIC ROOT: ICD-10-CM

## 2023-02-02 LAB
BUN SERPL-MCNC: 20 MG/DL (ref 5–25)
CREAT SERPL-MCNC: 1.03 MG/DL (ref 0.6–1.3)
GFR SERPL CREATININE-BSD FRML MDRD: 76 ML/MIN/1.73SQ M

## 2023-02-02 NOTE — TELEPHONE ENCOUNTER
Spoke with Patient reminding him to get his labs drawn prior to his CT appointment on 2/4/23 @ 1000  He will get them done in [facility]

## 2023-02-04 ENCOUNTER — HOSPITAL ENCOUNTER (OUTPATIENT)
Dept: RADIOLOGY | Facility: HOSPITAL | Age: 63
Discharge: HOME | End: 2023-02-04

## 2023-02-04 DIAGNOSIS — I71.21 ANEURYSM OF AORTIC ROOT: ICD-10-CM

## 2023-02-04 RX ADMIN — IOHEXOL 85 ML: 350 INJECTION, SOLUTION INTRAVENOUS at 10:14

## 2023-02-09 ENCOUNTER — OFFICE VISIT (OUTPATIENT)
Dept: CARDIAC SURGERY | Facility: CLINIC | Age: 63
End: 2023-02-09

## 2023-02-09 VITALS
OXYGEN SATURATION: 99 % | HEART RATE: 73 BPM | HEIGHT: 73 IN | BODY MASS INDEX: 22.6 KG/M2 | SYSTOLIC BLOOD PRESSURE: 132 MMHG | WEIGHT: 170.5 LBS | DIASTOLIC BLOOD PRESSURE: 86 MMHG

## 2023-02-09 DIAGNOSIS — I71.21 ANEURYSM OF AORTIC ROOT: Primary | ICD-10-CM

## 2023-02-09 DIAGNOSIS — Q23.1 BICUSPID AORTIC VALVE: ICD-10-CM

## 2023-02-09 NOTE — LETTER
Dear Dr Daljit Allan,    Marivel Wheeler was seen in our office today for routine follow up of his Bicuspid Aortic Valve and Aortic Root aneurysm  Recent CTA chest demonstrates stable findings of his aneurysm but there are some pulmonary nodules that have increased in size  It is recommended he have a f/u CT scan in 3-6 months for surveillance of these nodules, especially in light of his prior history tonsillar cancer  Patient is aware of these findings and was advised to contact your office      Thank you    Luke Sagastume, MSN, ACNP-BC

## 2023-02-09 NOTE — PROGRESS NOTES
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 s/p b/l tonsillectomy, chemo & rad rx (2010)  Patient has been followed in our aortic clinic with serial imaging and he is followed by Cardiology as well  Patient's PMHx is notable for HTN, hypothyroidism, tonsillar cancer and diverticulitis  Upon interview today patient reports he has been well  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 [employer]  He reports occasional,m transient lightheadedness with position change,  but states better since his Lisinopril dose was cut in half  He reports a dull ache in his left chest that is random ,  has been occurring for years and unrelated to exertion  He denies palpitations, fatigue, back pain, abdominal pain, extremity pain/weakness or numbness  Patient is a non-smoker  Past Medical History:  Past Medical History:   Diagnosis Date   • Aneurysm of aortic root    • Bicuspid aortic valve    • H/O hyperthyroidism    • History of diverticulitis    • History of echocardiogram 05/22/2018    EF 65%, bicuspid AV with superior-inferior irientation, mild AS, mild MR, trace TR, mildly dilated ascending aorta (sinus of valsalva is 4 2-cm)   • History of malignant neoplasm of tonsil     oral pharyngeal carcinoma stage III    Status post chemoradiation completed 9/10/2010   • History of rectal fissure    • Hypertension    • Hypothyroidism          Past Surgical History:   Past Surgical History:   Procedure Laterality Date   • COLONOSCOPY  10/11/2019   • INGUINAL HERNIA REPAIR     • TONSILLECTOMY     • UMBILICAL HERNIA REPAIR           Family History:  Family History   Problem Relation Age of Onset   • Dementia Mother    • Colon cancer Mother    • Breast cancer Mother    • Other Family         Sarcoma   • Other Father          Social History:    Social History     Substance and Sexual Activity   Alcohol Use No     Social History     Substance and Sexual Activity   Drug Use No     Social History     Tobacco Use   Smoking Status Former   • Packs/day: 0 50   • Years: 20 00   • Pack years: 10 00   • Types: Cigarettes   • Quit date:    • Years since quittin 1   Smokeless Tobacco Current   • Types: Chew   Tobacco Comments    on/off X about 20 years         Home Medications:   Prior to Admission medications    Medication Sig Start Date End Date Taking?  Authorizing Provider   atenolol (TENORMIN) 25 mg tablet Take 1 tablet (25 mg total) by mouth 2 (two) times a day 22  Yes Ramone Rangel MD   atorvastatin (LIPITOR) 20 mg tablet  21  Yes Historical Provider, MD   levothyroxine 88 mcg tablet Take 1 tablet by mouth daily 18  Yes Historical Provider, MD   lisinopril (ZESTRIL) 10 mg tablet Take 1 tablet (10 mg total) by mouth daily  Patient taking differently: Take 10 mg by mouth daily Cuts it in half 2/15/22  Yes Ramone Rangel MD   tamsulosin [facility]) 0 4 mg Take 0 4 mg by mouth daily 22  Yes Historical Provider, MD   ASPIRIN 81 PO Chew 81mg tablet 5 days a week  Patient not taking: Reported on 2023  Historical Provider, MD   diclofenac (VOLTAREN) 75 mg EC tablet Take 75 mg by mouth as needed  Patient not taking: Reported on 2023  Historical Provider, MD   ibuprofen (MOTRIN) 600 mg tablet Take 600 mg by mouth as needed  Patient not taking: Reported on 2023  Historical Provider, MD       Allergies:  No Known Allergies    Review of Systems:   Review of Systems - History obtained from chart review and the patient  General ROS: negative  Psychological ROS: negative  Ophthalmic ROS: positive for - uses glasses  ENT ROS: negative  Allergy and Immunology ROS: negative  Hematological and Lymphatic ROS: negative  Endocrine ROS: negative  Breast ROS: negative  Respiratory ROS: no cough, shortness of breath, or wheezing  Cardiovascular ROS: no chest pain or dyspnea on exertion  Gastrointestinal ROS: no abdominal pain, change in bowel habits, or black or bloody stools  Genito-Urinary ROS: no dysuria, trouble voiding, or hematuria  Musculoskeletal ROS: negative  Neurological ROS: positive for - very mild, occasional lightheadedness  Dermatological ROS: negative    Vital Signs:   Vitals:    02/09/23 1016   BP: 132/86   BP Location: Left arm   Patient Position: Sitting   Cuff Size: Standard   Pulse: 73   SpO2: 99%   Weight: 77 3 kg (170 lb 8 oz)   Height: 6' 1" (1 854 m)       Physical Exam:  General: Alert, oriented, Tall, thin, no acute distress  HEENT/NECK:  PERRLA  No jugular venous distention  Cardiac:Regular rate and rhythm, no murmurs rubs or gallops  Carotid arteries: 2+ pulses, no bruits  Pulmonary:  Breath sounds clear bilaterally  Abdomen:  Non-tender, Non-distended  Positive bowel sounds  Upper extremities: 2+ radial pulses; brisk capillary refill; hands warm  Lower extremities: Extremities warm/dry  PT/DP pulses 2+ bilaterally  No edema B/L  Neuro: Alert and oriented X 3  Sensation is grossly intact  No focal deficits  Musculoskeletal: MAEE,  stable gait  Skin: Warm/Dry, without rashes or lesions      Lab Results:     Results from last 7 days   Lab Units 02/02/23  1155   BUN mg/dL 20   CREATININE mg/dL 1 03     7/18/22 12:10 PM     Hemoglobin 12 5 - 17 0 g/dL 13 9    Hematocrit 37 0 - 48 0 % 39 4    WBC 4 0 - 10 5 thou/cmm 4 3    RBC 4 00 - 5 40 mill/cmm 4 59    Platelet Count 919 - 350 thou/cmm 180    MPV 7 5 - 11 3 fL 8 5    MCV 80 - 100 fL 86    MCH 27 0 - 36 0 pg 30 2    MCHC 32 0 - 37 0 g/dL 35 2 RDW 12 0 - 16 0 % 13 5       7/18/22 12:10 PM    Glucose 65 - 99 mg/dL 87    BUN 7 - 28 mg/dL 13    Creatinine 0 53 - 1 30 mg/dL 1 02    Sodium 135 - 145 mmol/L 138    Potassium 3 5 - 5 2 mmol/L 4 4    Chloride 100 - 109 mmol/L 104    Carbon Dioxide 23 - 31 mmol/L 30    Calcium 8 5 - 10 1 mg/dL 9 2    Alkaline Phosphatase 35 - 120 U/L 65    Albumin 3 5 - 4 8 g/dL 3 9    Bilirubin, Total 0 2 - 1 0 mg/dL 0 5    Comment: Use of this assay is not recommended for patients undergoing treatment with eltrombopag due to the potential for falsely elevated results  Protein, Total 6 3 - 8 3 g/dL 6 7    AST <41 U/L 16    ALT <56 U/L 17    Anion Gap 3 - 11 4    eGFRcr >59 83      7/18/22 12:10 PM     Cholesterol <200 mg/dL 137    Triglyceride <150 mg/dL 39    Cholesterol, HDL, Direct >40 mg/dL 54    Cholesterol, Non-HDL <160 mg/dL 83    Comment: Note: For [facility]P interpretive guidelines please refer to the Laboratory Handbook  Cholesterol, LDL, Calculated <130 mg/dL 75    Comment: LDL Cholesterol was calculated using the Friedewald equation  Direct measurement of LDL is not indicated for this patient based on [facility]'s analytical algorithm for measurement of LDL Cholesterol  CHOL/HDL Ratio  2 54    Comment: Relative Risk   1/2 Average Risk          3 43   Average Risk              4 97   2X Average Risk           9 55   3X Average Risk          23 39         Imaging Studies:     CTA Chest (gated): 2/4/23    1  No significant interval change in diameter of aortic root measuring 4 6 cm, this previously measured 4 6 cm on comparison study in May 2018  2   Ascending thoracic aorta measured at the level of the right pulmonary artery is slightly decreased in size now measuring up to 3 54 cm compared to 3 83 cm  3   Increase in size of 2 right upper lobe pulmonary nodules, the largest currently measures 0 6 cm which is increased from 0 29 cm   Indeterminate, recommend continued short-term interval follow-up (3-6 months)  Echocardiogram: 6/2/22    EF 60%  BAV; normal leaflet mobility, mild AS: MG 13 mm Hg; Vmax 2 3m/s; ISMAEL 1 03 cm2  Ao Ro 46 mm    I have personally reviewed pertinent films in PACS     PCP and Cardiology notes reviewed    Assessment:  Patient Active Problem List    Diagnosis Date Noted   • Aortic stenosis due to bicuspid aortic valve 08/02/2022   • Recurrent right inguinal hernia 09/04/2019   • Essential hypertension 02/13/2019   • Bicuspid aortic valve 08/18/2016   • Aneurysm of aortic root 08/03/2016         Impression/Plan:    Congenital Bicuspid Aortic Valve  Aortic Root Aneurysm    CTA chest performed prior to this visit demonstrates stable findings of the aortic root aneurysm, measuring 46 mm mm and ascending aorta measuring 36 mm in size at its greatest diameter  Echo in June 2022 demonstrates EF 60%, normal leaflet mobility of BAV with mild stenosis  These findings were confirmed and shared with the patient today  Patient does not meet surgical criteria at this point  We will continue with routine surveillance and have him return to aortic clinic in 2 years with CTA chest (gated to assess aortic root) and TTE  We will refer him to his PCP for repeat chest CT scan in 3-6 months for surveillance of  pulmonary nodules that have increased in size (noted on recent CTA) in light of prior tonsillar cancer  French Campshekhar Alex was comfortable with our recommendations, and his questions were answered to his satisfaction  Thank you for allowing us to participate in the care of this patient  Aortic Aneurysm Instructions were provided to the patient as follows:    1  No lifting more than 50 pounds  Regular aerobic exercise permitted and recommended  2  Maintain a controlled blood pressure with a goal of less than 120/80  3  Follow up in Aortic Clinic as recommended with radiology follow up as instructed  4   Report to the ER or call 911 immediately with the following signs / symptoms: sudden onset of back pain, chest pain or shortness of breath  5  Remain tobacco free  The patient recently had a screening colonoscopy in 10/11/19- [facility]  Therefore GI referral is not indicated at this time       SIGNATURE: RAVEN Rocha  DATE: February 9, 2023  TIME: 11:00 AM

## 2023-02-09 NOTE — LETTER
February 9, 2023     Ronda Diamond MD  [facility]  [address] [place] [address]    Patient: Martine Nice   YOB: 1960   Date of Visit: 2/9/2023       Dear Dr Tammy Mo:    Thank you for referring Conner Gallo to me for evaluation  Below are my notes for this consultation  If you have questions, please do not hesitate to call me  I look forward to following your patient along with you  Sincerely,        Floyd Lesches, MD        CC: MD Ritu Murray CRNP  2/9/2023 11:10 AM  Attested  Aortic Clinic  Martine Nice 60 y o  male MRN: [id number]      Reason for Consult / Principal Problem: Congenital Bicuspid Aortic Valve and Aortic Root Aneurysm    History of Present Illness: Martine Nice is a 60y o  year old male who presents today for ongoing surveillance of his BAV with aortic root aneurysm  This was initially identified as an incidental finding in 2011 on cancer surveillance imaging  Patient has h/o 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 s/p b/l tonsillectomy, chemo & rad rx (2010)  Patient has been followed in our aortic clinic with serial imaging and he is followed by Cardiology as well  Patient's PMHx is notable for HTN, hypothyroidism, tonsillar cancer and diverticulitis  Upon interview today patient reports he has been well  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 [employer]  He reports occasional,m transient lightheadedness with position change,  but states better since his Lisinopril dose was cut in half  He reports a dull ache in his left chest that is random ,  has been occurring for years and unrelated to exertion  He denies palpitations, fatigue, back pain, abdominal pain, extremity pain/weakness or numbness  Patient is a non-smoker      Past Medical History:  Past Medical History:   Diagnosis Date   • Aneurysm of aortic root    • Bicuspid aortic valve    • H/O hyperthyroidism    • History of diverticulitis    • History of echocardiogram 2018    EF 65%, bicuspid AV with superior-inferior irientation, mild AS, mild MR, trace TR, mildly dilated ascending aorta (sinus of valsalva is 4 2-cm)   • History of malignant neoplasm of tonsil     oral pharyngeal carcinoma stage III  Status post chemoradiation completed 9/10/2010   • History of rectal fissure    • Hypertension    • Hypothyroidism          Past Surgical History:   Past Surgical History:   Procedure Laterality Date   • COLONOSCOPY  10/11/2019   • INGUINAL HERNIA REPAIR     • TONSILLECTOMY     • UMBILICAL HERNIA REPAIR           Family History:  Family History   Problem Relation Age of Onset   • Dementia Mother    • Colon cancer Mother    • Breast cancer Mother    • Other Family         Sarcoma   • Other Father          Social History:    Social History     Substance and Sexual Activity   Alcohol Use No     Social History     Substance and Sexual Activity   Drug Use No     Social History     Tobacco Use   Smoking Status Former   • Packs/day: 0 50   • Years: 20    • Pack years: 10 00   • Types: Cigarettes   • Quit date:    • Years since quittin 1   Smokeless Tobacco Current   • Types: Chew   Tobacco Comments    on/off X about 20 years         Home Medications:   Prior to Admission medications    Medication Sig Start Date End Date Taking?  Authorizing Provider   atenolol (TENORMIN) 25 mg tablet Take 1 tablet (25 mg total) by mouth 2 (two) times a day 22  Yes Shreya Lopez MD   atorvastatin (LIPITOR) 20 mg tablet  21  Yes Historical Provider, MD   levothyroxine 88 mcg tablet Take 1 tablet by mouth daily 18  Yes Historical Provider, MD   lisinopril (ZESTRIL) 10 mg tablet Take 1 tablet (10 mg total) by mouth daily  Patient taking differently: Take 10 mg by mouth daily Cuts it in half 2/15/22  Yes Shreya Lopez MD   tamsulosin [facility]) 0 4 mg Take 0 4 mg by mouth daily 22  Yes Historical Provider, MD   ASPIRIN 81 PO Chew 81mg tablet 5 days a week  Patient not taking: Reported on 2/9/2023 2/9/23  Historical Provider, MD   diclofenac (VOLTAREN) 75 mg EC tablet Take 75 mg by mouth as needed  Patient not taking: Reported on 2/9/2023 2/9/23  Historical Provider, MD   ibuprofen (MOTRIN) 600 mg tablet Take 600 mg by mouth as needed  Patient not taking: Reported on 2/9/2023 5/6/17 2/9/23  Historical Provider, MD       Allergies:  No Known Allergies    Review of Systems:   Review of Systems - History obtained from chart review and the patient  General ROS: negative  Psychological ROS: negative  Ophthalmic ROS: positive for - uses glasses  ENT ROS: negative  Allergy and Immunology ROS: negative  Hematological and Lymphatic ROS: negative  Endocrine ROS: negative  Breast ROS: negative  Respiratory ROS: no cough, shortness of breath, or wheezing  Cardiovascular ROS: no chest pain or dyspnea on exertion  Gastrointestinal ROS: no abdominal pain, change in bowel habits, or black or bloody stools  Genito-Urinary ROS: no dysuria, trouble voiding, or hematuria  Musculoskeletal ROS: negative  Neurological ROS: positive for - very mild, occasional lightheadedness  Dermatological ROS: negative    Vital Signs:   Vitals:    02/09/23 1016   BP: 132/86   BP Location: Left arm   Patient Position: Sitting   Cuff Size: Standard   Pulse: 73   SpO2: 99%   Weight: 77 3 kg (170 lb 8 oz)   Height: 6' 1" (1 854 m)       Physical Exam:  General: Alert, oriented, Tall, thin, no acute distress  HEENT/NECK:  PERRLA  No jugular venous distention  Cardiac:Regular rate and rhythm, no murmurs rubs or gallops  Carotid arteries: 2+ pulses, no bruits  Pulmonary:  Breath sounds clear bilaterally  Abdomen:  Non-tender, Non-distended  Positive bowel sounds  Upper extremities: 2+ radial pulses; brisk capillary refill; hands warm  Lower extremities: Extremities warm/dry  PT/DP pulses 2+ bilaterally    No edema B/L  Neuro: Alert and oriented X 3  Sensation is grossly intact  No focal deficits  Musculoskeletal: MAEE,  stable gait  Skin: Warm/Dry, without rashes or lesions  Lab Results:     Results from last 7 days   Lab Units 02/02/23  1155   BUN mg/dL 20   CREATININE mg/dL 1 03     7/18/22 12:10 PM     Hemoglobin 12 5 - 17 0 g/dL 13 9    Hematocrit 37 0 - 48 0 % 39 4    WBC 4 0 - 10 5 thou/cmm 4 3    RBC 4 00 - 5 40 mill/cmm 4 59    Platelet Count 210 - 350 thou/cmm 180    MPV 7 5 - 11 3 fL 8 5    MCV 80 - 100 fL 86    MCH 27 0 - 36 0 pg 30 2    MCHC 32 0 - 37 0 g/dL 35 2    RDW 12 0 - 16 0 % 13 5       7/18/22 12:10 PM    Glucose 65 - 99 mg/dL 87    BUN 7 - 28 mg/dL 13    Creatinine 0 53 - 1 30 mg/dL 1 02    Sodium 135 - 145 mmol/L 138    Potassium 3 5 - 5 2 mmol/L 4 4    Chloride 100 - 109 mmol/L 104    Carbon Dioxide 23 - 31 mmol/L 30    Calcium 8 5 - 10 1 mg/dL 9 2    Alkaline Phosphatase 35 - 120 U/L 65    Albumin 3 5 - 4 8 g/dL 3 9    Bilirubin, Total 0 2 - 1 0 mg/dL 0 5    Comment: Use of this assay is not recommended for patients undergoing treatment with eltrombopag due to the potential for falsely elevated results  Protein, Total 6 3 - 8 3 g/dL 6 7    AST <41 U/L 16    ALT <56 U/L 17    Anion Gap 3 - 11 4    eGFRcr >59 83      7/18/22 12:10 PM     Cholesterol <200 mg/dL 137    Triglyceride <150 mg/dL 39    Cholesterol, HDL, Direct >40 mg/dL 54    Cholesterol, Non-HDL <160 mg/dL 83    Comment: Note: For NCEP interpretive guidelines please refer to the Laboratory Handbook  Cholesterol, LDL, Calculated <130 mg/dL 75    Comment: LDL Cholesterol was calculated using the Friedewald equation  Direct measurement of LDL is not indicated for this patient based on [facility]'s analytical algorithm for measurement of LDL Cholesterol     CHOL/HDL Ratio  2 54    Comment: Relative Risk   1/2 Average Risk          3 43   Average Risk              4 97   2X Average Risk           9 55   3X Average Risk          23 39         Imaging Studies: CTA Chest (gated): 2/4/23    1  No significant interval change in diameter of aortic root measuring 4 6 cm, this previously measured 4 6 cm on comparison study in May 2018  2   Ascending thoracic aorta measured at the level of the right pulmonary artery is slightly decreased in size now measuring up to 3 54 cm compared to 3 83 cm  3   Increase in size of 2 right upper lobe pulmonary nodules, the largest currently measures 0 6 cm which is increased from 0 29 cm  Indeterminate, recommend continued short-term interval follow-up (3-6 months)  Echocardiogram: 6/2/22    EF 60%  BAV; normal leaflet mobility, mild AS: MG 13 mm Hg; Vmax 2 3m/s; ISMAEL 1 03 cm2  Ao Ro 46 mm    I have personally reviewed pertinent films in PACS     PCP and Cardiology notes reviewed    Assessment:  Patient Active Problem List    Diagnosis Date Noted   • Aortic stenosis due to bicuspid aortic valve 08/02/2022   • Recurrent right inguinal hernia 09/04/2019   • Essential hypertension 02/13/2019   • Bicuspid aortic valve 08/18/2016   • Aneurysm of aortic root 08/03/2016         Impression/Plan:    Congenital Bicuspid Aortic Valve  Aortic Root Aneurysm    CTA chest performed prior to this visit demonstrates stable findings of the aortic root aneurysm, measuring 46 mm mm and ascending aorta measuring 36 mm in size at its greatest diameter  Echo in June 2022 demonstrates EF 60%, normal leaflet mobility of BAV with mild stenosis  These findings were confirmed and shared with the patient today  Patient does not meet surgical criteria at this point  We will continue with routine surveillance and have him return to aortic clinic in 2 years with CTA chest (gated to assess aortic root) and TTE  We will refer him to his PCP for repeat chest CT scan in 3-6 months for surveillance of  pulmonary nodules that have increased in size (noted on recent CTA) in light of prior tonsillar cancer      Marce De La Torre was comfortable with our recommendations, and his questions were answered to his satisfaction  Thank you for allowing us to participate in the care of this patient  Aortic Aneurysm Instructions were provided to the patient as follows:    1  No lifting more than 50 pounds  Regular aerobic exercise permitted and recommended  2  Maintain a controlled blood pressure with a goal of less than 120/80  3  Follow up in Aortic Clinic as recommended with radiology follow up as instructed  4  Report to the ER or call 911 immediately with the following signs / symptoms: sudden onset of back pain, chest pain or shortness of breath  5  Remain tobacco free  The patient recently had a screening colonoscopy in 10/11/19- [facility]  Therefore GI referral is not indicated at this time  SIGNATURE: RAVEN Marley  DATE: February 9, 2023  TIME: 11:00 AM  Attestation signed by Chantal Ramos MD at 2/9/2023  3:24 PM:  Patient seen and evaluated with [place] / ALEXANDRA  I agree with the above assessment and plan with the following additions  The patient is a very pleasant 59-year-old man with history of aortic root aneurysm in the setting of a bicuspid aortic valve, he returns today for a routine 2-year follow-up  He is currently asymptomatic  I personally reviewed diagnostic images including CT of the chest on 2/4/2023 that shows an ascending aorta with a maximum diameter of 3 6 cm and an aortic root of 4 6 cm which is unchanged from CT dating back to 2016  Transthoracic echo on 6/20/2022 shows a bicuspid valve with mild aortic stenosis, the aortic root was reported at 4 9 cm, I disagree with that measurement, I measured myself at 4 6 cm which is consistent with the CT finding as well  There is no indication for intervention at this point    We talked about the importance of avoiding tobacco products, maintaining adequate blood pressure control, avoid sustained straining, he was also instructed to go to the closest emergency department if he were to experience severe chest pain or severe upper back pain  I would like to see him back in 2 years with a gated CTA of the chest and a transthoracic echocardiogram     This note was completed in part utilizing [employer] fluency direct voice recognition software  Grammatical errors, random word insertion, spelling mistakes, and incomplete sentences may be an occasional consequence of the system secondary to software limitations, ambient noise and hardware issues  At the time of dictation, efforts were made to edit, clarify and /or correct errors  Please read the chart carefully and recognize, using context, where substitutions have occurred  If you have any questions or concerns about the context, text or information contained within the body of this dictation, please contact myself, the provider, for further clarification

## 2023-06-16 DIAGNOSIS — I10 BENIGN HYPERTENSION: ICD-10-CM

## 2023-06-16 RX ORDER — ATENOLOL 25 MG/1
TABLET ORAL
Qty: 180 TABLET | Refills: 3 | Status: SHIPPED | OUTPATIENT
Start: 2023-06-16

## 2023-06-27 PROCEDURE — 88341 IMHCHEM/IMCYTCHM EA ADD ANTB: CPT | Performed by: STUDENT IN AN ORGANIZED HEALTH CARE EDUCATION/TRAINING PROGRAM

## 2023-06-27 PROCEDURE — 88342 IMHCHEM/IMCYTCHM 1ST ANTB: CPT | Performed by: STUDENT IN AN ORGANIZED HEALTH CARE EDUCATION/TRAINING PROGRAM

## 2023-06-27 PROCEDURE — 88305 TISSUE EXAM BY PATHOLOGIST: CPT | Performed by: STUDENT IN AN ORGANIZED HEALTH CARE EDUCATION/TRAINING PROGRAM

## 2023-07-07 PROCEDURE — 88305 TISSUE EXAM BY PATHOLOGIST: CPT | Performed by: STUDENT IN AN ORGANIZED HEALTH CARE EDUCATION/TRAINING PROGRAM

## 2023-07-07 PROCEDURE — 88342 IMHCHEM/IMCYTCHM 1ST ANTB: CPT | Performed by: STUDENT IN AN ORGANIZED HEALTH CARE EDUCATION/TRAINING PROGRAM

## 2023-07-07 PROCEDURE — 88341 IMHCHEM/IMCYTCHM EA ADD ANTB: CPT | Performed by: STUDENT IN AN ORGANIZED HEALTH CARE EDUCATION/TRAINING PROGRAM

## 2023-07-12 ENCOUNTER — APPOINTMENT (OUTPATIENT)
Dept: LAB | Facility: HOSPITAL | Age: 63
End: 2023-07-12
Payer: COMMERCIAL

## 2023-07-12 DIAGNOSIS — C02.1 SQUAMOUS CELL CARCINOMA OF LATERAL TONGUE (HCC): ICD-10-CM

## 2023-07-12 LAB
BUN SERPL-MCNC: 25 MG/DL (ref 5–25)
CREAT SERPL-MCNC: 1.02 MG/DL (ref 0.6–1.3)
GFR SERPL CREATININE-BSD FRML MDRD: 77 ML/MIN/1.73SQ M

## 2023-07-12 PROCEDURE — 84520 ASSAY OF UREA NITROGEN: CPT

## 2023-07-12 PROCEDURE — 82565 ASSAY OF CREATININE: CPT

## 2023-07-12 PROCEDURE — 36415 COLL VENOUS BLD VENIPUNCTURE: CPT

## 2023-07-14 ENCOUNTER — HOSPITAL ENCOUNTER (OUTPATIENT)
Dept: CT IMAGING | Facility: HOSPITAL | Age: 63
End: 2023-07-14
Attending: OTOLARYNGOLOGY
Payer: COMMERCIAL

## 2023-07-14 DIAGNOSIS — C02.1 SQUAMOUS CELL CARCINOMA OF LATERAL TONGUE (HCC): ICD-10-CM

## 2023-07-14 PROCEDURE — G1004 CDSM NDSC: HCPCS

## 2023-07-14 PROCEDURE — 70491 CT SOFT TISSUE NECK W/DYE: CPT

## 2023-07-14 RX ADMIN — IOHEXOL 85 ML: 350 INJECTION, SOLUTION INTRAVENOUS at 11:01

## 2023-07-29 ENCOUNTER — HOSPITAL ENCOUNTER (OUTPATIENT)
Dept: MRI IMAGING | Facility: HOSPITAL | Age: 63
Discharge: HOME | End: 2023-07-29
Payer: COMMERCIAL

## 2023-07-29 DIAGNOSIS — C02.3 MALIGNANT NEOPLASM OF ANTERIOR TWO-THIRDS OF TONGUE, PART UNSPECIFIED (HCC): ICD-10-CM

## 2023-07-29 PROCEDURE — 70543 MRI ORBT/FAC/NCK W/O &W/DYE: CPT

## 2023-07-29 PROCEDURE — G1004 CDSM NDSC: HCPCS

## 2023-07-29 PROCEDURE — A9585 GADOBUTROL INJECTION: HCPCS | Performed by: RADIOLOGY

## 2023-07-29 RX ADMIN — GADOBUTROL 8 ML: 604.72 INJECTION INTRAVENOUS at 14:35

## 2023-11-20 ENCOUNTER — HOSPITAL ENCOUNTER (OUTPATIENT)
Dept: NUCLEAR MEDICINE | Facility: HOSPITAL | Age: 63
Discharge: HOME | End: 2023-11-20
Attending: OTOLARYNGOLOGY
Payer: COMMERCIAL

## 2023-11-20 DIAGNOSIS — C02.1 SQUAMOUS CELL CARCINOMA OF LATERAL TONGUE (HCC): ICD-10-CM

## 2023-11-20 LAB — GLUCOSE SERPL-MCNC: 88 MG/DL (ref 65–140)

## 2023-11-20 PROCEDURE — G1004 CDSM NDSC: HCPCS

## 2023-11-20 PROCEDURE — A9552 F18 FDG: HCPCS

## 2023-11-20 PROCEDURE — 78815 PET IMAGE W/CT SKULL-THIGH: CPT

## 2023-11-20 PROCEDURE — 82948 REAGENT STRIP/BLOOD GLUCOSE: CPT

## 2023-11-30 ENCOUNTER — OFFICE VISIT (OUTPATIENT)
Dept: CARDIOLOGY CLINIC | Facility: CLINIC | Age: 63
End: 2023-11-30
Payer: COMMERCIAL

## 2023-11-30 VITALS
DIASTOLIC BLOOD PRESSURE: 80 MMHG | WEIGHT: 168 LBS | HEIGHT: 73 IN | HEART RATE: 76 BPM | SYSTOLIC BLOOD PRESSURE: 130 MMHG | BODY MASS INDEX: 22.26 KG/M2

## 2023-11-30 DIAGNOSIS — Q23.1 AORTIC STENOSIS DUE TO BICUSPID AORTIC VALVE: ICD-10-CM

## 2023-11-30 DIAGNOSIS — Q23.0 AORTIC STENOSIS DUE TO BICUSPID AORTIC VALVE: ICD-10-CM

## 2023-11-30 DIAGNOSIS — I71.21 ANEURYSM OF AORTIC ROOT (HCC): ICD-10-CM

## 2023-11-30 DIAGNOSIS — I10 ESSENTIAL HYPERTENSION: Primary | ICD-10-CM

## 2023-11-30 PROCEDURE — 99214 OFFICE O/P EST MOD 30 MIN: CPT | Performed by: INTERNAL MEDICINE

## 2023-11-30 PROCEDURE — 93000 ELECTROCARDIOGRAM COMPLETE: CPT | Performed by: INTERNAL MEDICINE

## 2023-11-30 NOTE — PROGRESS NOTES
Patient ID: Lila Murcia is a 60 y.o. male. Plan: Aortic stenosis due to bicuspid aortic valve  Minimal stenosis at most.    Aneurysm of aortic root ([address])  Follows for surveillance with my thoracic surgical colleague. Essential hypertension  Well controlled and would continue current regimen. Follow up Plan/Other summary comments:  Return in about 18 months (around 5/30/2025). HPI: Patient seen in f/u regarding the above issues. Since the last visit he has felt well. He certainly has had no change in exertional capacity. He had cancer of the tongue which was removed and he is bit worried about recurrence. Most recently he has taken up to saxophone! To reiterate the patient has a bicuspid aortic valve and associated aortic root aneurysm. This was discovered when he underwent surveillance imaging related to squamous cell carcinoma of the tonsil. Aortic root size is have remained in the 4.5-4.8 cm range for years. Results for orders placed or performed in visit on 11/30/23   POCT ECG    Impression    NSR. LVH. Otherwise WNL. Most recent or relevant cardiac/vascular testing:       TTE 07/02/2019: Aortic root to 4.5 cm. Normal LVEF. Mild aortic stenosis. TTE 06/15/2021:  No change  TTE 06/20/2022:  Very mild aortic stenosis. Normal LVEF. Aortic root 4.9 cm ascending aorta 4.7 cm. CT of Chest 2/4/2023: 4.6 cm ascending aorta. Past Surgical History:   Procedure Laterality Date    COLONOSCOPY  10/11/2019    INGUINAL HERNIA REPAIR      TONSILLECTOMY      UMBILICAL HERNIA REPAIR         Lipid Profile: No results found for: "CHOL", "TRIG", "HDL", "LDL"      Review of Systems   10  point ROS  was otherwise non pertinent or negative except as per HPI or as below. Gait:  Normal.        Objective:     /80   Pulse 76   Ht 6' 1" (1.854 m)   Wt 76.2 kg (168 lb)   BMI 22.16 kg/m²     PHYSICAL EXAM:    General:  Normal appearance in no distress.   Eyes: Anicteric. Oral mucosa:  Moist.  Neck:  No JVD. Carotid upstrokes are brisk without bruits. No masses. Chest:  Clear to auscultation. Cardiac:  No palpable PMI. Normal S1 and S2.  Grade 1 systolic murmur at the base with limited radiation. No gallop or rub. Abdomen:  Soft and nontender. No palpable organomegaly or aortic enlargement. Extremities:  No peripheral edema. Musculoskeletal:  Symmetric. Vascular:  Femoral pulses are brisk without bruits. Popliteal pulses are intact bilaterally. Pedal pulses are intact. Neuro:  Grossly symmetric. Psych:  Alert and oriented x3. Current Outpatient Medications:     atenolol (TENORMIN) 25 mg tablet, TAKE 1 TABLET TWICE A DAY, Disp: 180 tablet, Rfl: 3    atorvastatin (LIPITOR) 20 mg tablet, Take 20 mg by mouth daily, Disp: , Rfl:     levothyroxine 88 mcg tablet, Take 1 tablet by mouth daily, Disp: , Rfl: 0    lisinopril (ZESTRIL) 10 mg tablet, Take 1 tablet (10 mg total) by mouth daily (Patient taking differently: Take 5 mg by mouth daily), Disp: 90 tablet, Rfl: 3    tamsulosin (FLOMAX) 0.4 mg, Take 0.4 mg by mouth daily, Disp: , Rfl:   No Known Allergies  Past Medical History:   Diagnosis Date    Aneurysm of aortic root (HCC)     Bicuspid aortic valve     H/O hyperthyroidism     History of diverticulitis     History of echocardiogram 2018    EF 65%, bicuspid AV with superior-inferior irientation, mild AS, mild MR, trace TR, mildly dilated ascending aorta (sinus of valsalva is 4.2-cm)    History of malignant neoplasm of tonsil     oral pharyngeal carcinoma stage III.   Status post chemoradiation completed 9/10/2010    History of rectal fissure     Hypertension     Hypothyroidism            Social History     Tobacco Use   Smoking Status Former    Packs/day: 0.50    Years: 20.00    Total pack years: 10.00    Types: Cigarettes    Quit date:     Years since quittin.9   Smokeless Tobacco Current    Types: Chew   Tobacco Comments    on/off X about 20 years

## 2024-06-10 DIAGNOSIS — I10 BENIGN HYPERTENSION: ICD-10-CM

## 2024-06-10 RX ORDER — ATENOLOL 25 MG/1
TABLET ORAL
Qty: 180 TABLET | Refills: 1 | Status: SHIPPED | OUTPATIENT
Start: 2024-06-10

## 2024-12-09 DIAGNOSIS — I10 BENIGN HYPERTENSION: ICD-10-CM

## 2024-12-10 RX ORDER — ATENOLOL 25 MG/1
25 TABLET ORAL 2 TIMES DAILY
Qty: 180 TABLET | Refills: 3 | Status: SHIPPED | OUTPATIENT
Start: 2024-12-10

## 2025-02-04 DIAGNOSIS — Q25.43 ANEURYSM OF AORTIC ROOT: ICD-10-CM

## 2025-02-04 DIAGNOSIS — Q23.81 BICUSPID AORTIC VALVE: Primary | ICD-10-CM

## 2025-02-17 PROBLEM — C02.1: Status: ACTIVE | Noted: 2023-07-31

## 2025-02-17 PROBLEM — C76.0 MALIGNANT NEOPLASM OF HEAD, FACE AND NECK (HCC): Status: ACTIVE | Noted: 2025-01-31

## 2025-03-27 DIAGNOSIS — I10 BENIGN HYPERTENSION: ICD-10-CM

## 2025-03-27 NOTE — TELEPHONE ENCOUNTER
Pharmacy change to [facility]      Reason for call:   [x] Refill   [] Prior Auth  [] Other:     Office:   [] PCP/Provider -   [x] Specialty/Provider - Liliane Justice    Medication:   Atenolol 25 mg, 1 bid, 180    Pharmacy:   [facility]   Does the patient have enough for 3 days?   [x] Yes   [] No - Send as HP to POD    Mail Away Pharmacy   Does the patient have enough for 10 days?   [] Yes   [] No - Send as HP to POD

## 2025-03-28 RX ORDER — ATENOLOL 25 MG/1
25 TABLET ORAL 2 TIMES DAILY
Qty: 180 TABLET | Refills: 1 | Status: SHIPPED | OUTPATIENT
Start: 2025-03-28

## 2025-04-21 ENCOUNTER — HOSPITAL ENCOUNTER (OUTPATIENT)
Dept: NON INVASIVE DIAGNOSTICS | Facility: CLINIC | Age: 65
Discharge: HOME | End: 2025-04-21
Payer: COMMERCIAL

## 2025-04-21 VITALS
BODY MASS INDEX: 23.06 KG/M2 | DIASTOLIC BLOOD PRESSURE: 80 MMHG | HEIGHT: 73 IN | SYSTOLIC BLOOD PRESSURE: 140 MMHG | HEART RATE: 68 BPM | WEIGHT: 174 LBS

## 2025-04-21 DIAGNOSIS — Q23.81 BICUSPID AORTIC VALVE: ICD-10-CM

## 2025-04-21 DIAGNOSIS — Q25.43 ANEURYSM OF AORTIC ROOT: ICD-10-CM

## 2025-04-21 LAB
AORTIC ROOT: 4.6 CM
AORTIC VALVE MEAN VELOCITY: 16.2 M/S
ASCENDING AORTA: 3.7 CM
AV AREA BY CONTINUOUS VTI: 1.4 CM2
AV AREA PEAK VELOCITY: 1.2 CM2
AV LVOT MEAN GRADIENT: 1 MMHG
AV LVOT PEAK GRADIENT: 1 MMHG
AV MEAN PRESS GRAD SYS DOP V1V2: 12 MMHG
AV ORIFICE AREA US: 1.4 CM2
AV PEAK GRADIENT: 21 MMHG
AV VELOCITY RATIO: 0.31
AV VMAX SYS DOP: 2.3 M/S
BSA FOR ECHO PROCEDURE: 2.03 M2
DOP CALC AO VTI: 49.24 CM
DOP CALC LVOT AREA: 4.52 CM2
DOP CALC LVOT CARDIAC INDEX: 2.34 L/MIN/M2
DOP CALC LVOT CARDIAC OUTPUT: 4.74 L/MIN
DOP CALC LVOT DIAMETER: 2.4 CM
DOP CALC LVOT PEAK VEL VTI: 15.29 CM
DOP CALC LVOT PEAK VEL: 0.6 M/S
DOP CALC LVOT STROKE INDEX: 35 ML/M2
DOP CALC LVOT STROKE VOLUME: 71
E WAVE DECELERATION TIME: 250 MS
E/A RATIO: 0.88
FRACTIONAL SHORTENING: 29 (ref 28–44)
INTERVENTRICULAR SEPTUM IN DIASTOLE (PARASTERNAL SHORT AXIS VIEW): 1 CM
INTERVENTRICULAR SEPTUM: 1 CM (ref 0.6–1.1)
LAAS-AP2: 16.6 CM2
LAAS-AP4: 6.8 CM2
LEFT ATRIUM SIZE: 3 CM
LEFT ATRIUM VOLUME (MOD BIPLANE): 29 ML
LEFT ATRIUM VOLUME INDEX (MOD BIPLANE): 14.3 ML/M2
LEFT INTERNAL DIMENSION IN SYSTOLE: 2.9 CM (ref 2.1–4)
LEFT VENTRICULAR INTERNAL DIMENSION IN DIASTOLE: 4.1 CM (ref 3.5–6)
LEFT VENTRICULAR POSTERIOR WALL IN END DIASTOLE: 1.1 CM
LEFT VENTRICULAR STROKE VOLUME: 44 ML
LV EF US.2D.A4C+ESTIMATED: 58 %
LVSV (TEICH): 44 ML
MV E'TISSUE VEL-SEP: 7 CM/S
MV PEAK A VEL: 0.86 M/S
MV PEAK E VEL: 76 CM/S
MV STENOSIS PRESSURE HALF TIME: 73 MS
MV VALVE AREA P 1/2 METHOD: 3
RIGHT ATRIUM AREA SYSTOLE A4C: 8.9 CM2
RIGHT VENTRICLE ID DIMENSION: 3 CM
SINOTUBULAR JUNCTION: 3.6 CM
SL CV LEFT ATRIUM LENGTH A2C: 5 CM
SL CV LV EF: 55
SL CV PED ECHO LEFT VENTRICLE DIASTOLIC VOLUME (MOD BIPLANE) 2D: 76 ML
SL CV PED ECHO LEFT VENTRICLE SYSTOLIC VOLUME (MOD BIPLANE) 2D: 32 ML
SL CV SINUS OF VALSALVA 2D: 4.9 CM
STJ: 3.6 CM
TR MAX PG: 20 MMHG
TR PEAK VELOCITY: 2.2 M/S
TRICUSPID ANNULAR PLANE SYSTOLIC EXCURSION: 2.5 CM
TRICUSPID VALVE PEAK REGURGITATION VELOCITY: 2.23 M/S

## 2025-04-21 PROCEDURE — 93306 TTE W/DOPPLER COMPLETE: CPT | Performed by: INTERNAL MEDICINE

## 2025-04-21 PROCEDURE — 93306 TTE W/DOPPLER COMPLETE: CPT

## 2025-05-02 DIAGNOSIS — Q25.43 ANEURYSM OF AORTIC ROOT: Primary | ICD-10-CM

## 2025-05-02 DIAGNOSIS — Q23.81 AORTIC STENOSIS DUE TO BICUSPID AORTIC VALVE: ICD-10-CM

## 2025-05-02 DIAGNOSIS — I35.0 AORTIC STENOSIS DUE TO BICUSPID AORTIC VALVE: ICD-10-CM

## 2025-05-06 ENCOUNTER — HOSPITAL ENCOUNTER (OUTPATIENT)
Dept: CT IMAGING | Facility: HOSPITAL | Age: 65
Discharge: HOME | End: 2025-05-06
Attending: THORACIC SURGERY (CARDIOTHORACIC VASCULAR SURGERY)
Payer: COMMERCIAL

## 2025-05-06 ENCOUNTER — HOSPITAL ENCOUNTER (OUTPATIENT)
Dept: CT IMAGING | Facility: HOSPITAL | Age: 65
Discharge: HOME | End: 2025-05-06
Attending: THORACIC SURGERY (CARDIOTHORACIC VASCULAR SURGERY)

## 2025-05-06 ENCOUNTER — APPOINTMENT (OUTPATIENT)
Dept: LAB | Facility: HOSPITAL | Age: 65
End: 2025-05-06
Payer: COMMERCIAL

## 2025-05-06 DIAGNOSIS — Q23.81 BICUSPID AORTIC VALVE: ICD-10-CM

## 2025-05-06 DIAGNOSIS — Q25.43 ANEURYSM OF AORTIC ROOT: ICD-10-CM

## 2025-05-06 DIAGNOSIS — I35.0 AORTIC STENOSIS DUE TO BICUSPID AORTIC VALVE: ICD-10-CM

## 2025-05-06 DIAGNOSIS — Q23.81 AORTIC STENOSIS DUE TO BICUSPID AORTIC VALVE: ICD-10-CM

## 2025-05-06 LAB
ANION GAP SERPL CALCULATED.3IONS-SCNC: 5 MMOL/L (ref 4–13)
BUN SERPL-MCNC: 17 MG/DL (ref 5–25)
CALCIUM SERPL-MCNC: 9.3 MG/DL (ref 8.4–10.2)
CHLORIDE SERPL-SCNC: 101 MMOL/L (ref 96–108)
CO2 SERPL-SCNC: 31 MMOL/L (ref 21–32)
CREAT SERPL-MCNC: 0.97 MG/DL (ref 0.6–1.3)
GFR SERPL CREATININE-BSD FRML MDRD: 81 ML/MIN/1.73SQ M
GLUCOSE SERPL-MCNC: 99 MG/DL (ref 65–140)
POTASSIUM SERPL-SCNC: 4.4 MMOL/L (ref 3.5–5.3)
SODIUM SERPL-SCNC: 137 MMOL/L (ref 135–147)

## 2025-05-06 PROCEDURE — 80048 BASIC METABOLIC PNL TOTAL CA: CPT

## 2025-05-06 PROCEDURE — 36415 COLL VENOUS BLD VENIPUNCTURE: CPT

## 2025-05-06 PROCEDURE — 71275 CT ANGIOGRAPHY CHEST: CPT

## 2025-05-06 RX ADMIN — IOHEXOL 100 ML: 350 INJECTION, SOLUTION INTRAVENOUS at 14:48

## 2025-05-15 ENCOUNTER — ANESTHESIA EVENT (OUTPATIENT)
Dept: GASTROENTEROLOGY | Facility: HOSPITAL | Age: 65
End: 2025-05-15
Payer: COMMERCIAL

## 2025-05-15 ENCOUNTER — ANESTHESIA (OUTPATIENT)
Dept: GASTROENTEROLOGY | Facility: HOSPITAL | Age: 65
End: 2025-05-15
Payer: COMMERCIAL

## 2025-05-15 ENCOUNTER — HOSPITAL ENCOUNTER (OUTPATIENT)
Dept: GASTROENTEROLOGY | Facility: HOSPITAL | Age: 65
Setting detail: OUTPATIENT SURGERY
End: 2025-05-15
Attending: INTERNAL MEDICINE
Payer: COMMERCIAL

## 2025-05-15 VITALS
RESPIRATION RATE: 20 BRPM | DIASTOLIC BLOOD PRESSURE: 82 MMHG | SYSTOLIC BLOOD PRESSURE: 119 MMHG | OXYGEN SATURATION: 98 % | TEMPERATURE: 97.1 F | HEART RATE: 89 BPM

## 2025-05-15 DIAGNOSIS — C76.0 MALIGNANT NEOPLASM OF HEAD, FACE AND NECK (HCC): ICD-10-CM

## 2025-05-15 DIAGNOSIS — K57.90 DIVERTICULAR DISEASE: ICD-10-CM

## 2025-05-15 DIAGNOSIS — C02.1: ICD-10-CM

## 2025-05-15 DIAGNOSIS — R13.10 DYSPHAGIA, UNSPECIFIED TYPE: ICD-10-CM

## 2025-05-15 DIAGNOSIS — Z80.0 FAMILY HISTORY OF COLON CANCER: ICD-10-CM

## 2025-05-15 DIAGNOSIS — Z12.11 SCREENING FOR COLON CANCER: ICD-10-CM

## 2025-05-15 LAB
ATRIAL RATE: 73 BPM
P AXIS: 58 DEGREES
PR INTERVAL: 174 MS
QRS AXIS: -38 DEGREES
QRSD INTERVAL: 88 MS
QT INTERVAL: 420 MS
QTC INTERVAL: 463 MS
T WAVE AXIS: 24 DEGREES
VENTRICULAR RATE: 73 BPM

## 2025-05-15 PROCEDURE — 93010 ELECTROCARDIOGRAM REPORT: CPT | Performed by: INTERNAL MEDICINE

## 2025-05-15 PROCEDURE — 93005 ELECTROCARDIOGRAM TRACING: CPT

## 2025-05-15 RX ORDER — PROPOFOL 10 MG/ML
INJECTION, EMULSION INTRAVENOUS CONTINUOUS PRN
Status: DISCONTINUED | OUTPATIENT
Start: 2025-05-15 | End: 2025-05-15

## 2025-05-15 RX ORDER — PROPOFOL 10 MG/ML
INJECTION, EMULSION INTRAVENOUS AS NEEDED
Status: DISCONTINUED | OUTPATIENT
Start: 2025-05-15 | End: 2025-05-15

## 2025-05-15 RX ORDER — PHENYLEPHRINE HCL IN 0.9% NACL 1 MG/10 ML
SYRINGE (ML) INTRAVENOUS AS NEEDED
Status: DISCONTINUED | OUTPATIENT
Start: 2025-05-15 | End: 2025-05-15

## 2025-05-15 RX ORDER — EPHEDRINE SULFATE 50 MG/ML
INJECTION INTRAVENOUS AS NEEDED
Status: DISCONTINUED | OUTPATIENT
Start: 2025-05-15 | End: 2025-05-15

## 2025-05-15 RX ORDER — SODIUM CHLORIDE, SODIUM LACTATE, POTASSIUM CHLORIDE, CALCIUM CHLORIDE 600; 310; 30; 20 MG/100ML; MG/100ML; MG/100ML; MG/100ML
INJECTION, SOLUTION INTRAVENOUS CONTINUOUS PRN
Status: DISCONTINUED | OUTPATIENT
Start: 2025-05-15 | End: 2025-05-15

## 2025-05-15 RX ORDER — ONDANSETRON 2 MG/ML
4 INJECTION INTRAMUSCULAR; INTRAVENOUS ONCE AS NEEDED
Status: CANCELLED | OUTPATIENT
Start: 2025-05-15

## 2025-05-15 RX ORDER — LIDOCAINE HYDROCHLORIDE 20 MG/ML
INJECTION, SOLUTION EPIDURAL; INFILTRATION; INTRACAUDAL; PERINEURAL AS NEEDED
Status: DISCONTINUED | OUTPATIENT
Start: 2025-05-15 | End: 2025-05-15

## 2025-05-15 RX ADMIN — Medication 100 MCG: at 10:42

## 2025-05-15 RX ADMIN — PROPOFOL 100 MG: 10 INJECTION, EMULSION INTRAVENOUS at 10:31

## 2025-05-15 RX ADMIN — SODIUM CHLORIDE, SODIUM LACTATE, POTASSIUM CHLORIDE, AND CALCIUM CHLORIDE: .6; .31; .03; .02 INJECTION, SOLUTION INTRAVENOUS at 10:25

## 2025-05-15 RX ADMIN — PROPOFOL 150 MCG/KG/MIN: 10 INJECTION, EMULSION INTRAVENOUS at 10:31

## 2025-05-15 RX ADMIN — PROPOFOL 20 MG: 10 INJECTION, EMULSION INTRAVENOUS at 10:34

## 2025-05-15 RX ADMIN — Medication 100 MCG: at 10:45

## 2025-05-15 RX ADMIN — LIDOCAINE HYDROCHLORIDE 50 MG: 20 INJECTION, SOLUTION EPIDURAL; INFILTRATION; INTRACAUDAL; PERINEURAL at 10:31

## 2025-05-15 RX ADMIN — EPHEDRINE SULFATE 10 MG: 50 INJECTION INTRAVENOUS at 10:45

## 2025-05-15 NOTE — ANESTHESIA PREPROCEDURE EVALUATION
Procedure:  COLONOSCOPY    Relevant Problems   CARDIO   (+) Aortic stenosis due to bicuspid aortic valve   (+) Essential hypertension      ECHO 4/21/25:      Left Ventricle: Left ventricular cavity size is normal. Wall thickness is mildly increased. The left ventricular ejection fraction is 55%. Systolic function is normal. Wall motion is normal. Diastolic function is mildly abnormal, consistent with grade I (abnormal) relaxation.    Aortic Valve: The aortic valve is bicuspid. There is moderate stenosis. The aortic valve peak velocity is 2.3 m/s. The aortic valve peak gradient is 21 mmHg. The aortic valve mean gradient is 12 mmHg. The dimensionless velocity index is 0.31. The aortic valve area is 1.40 cm2. The stroke volume index is 35.00 ml/m2.    Mitral Valve: There is mild regurgitation.    Tricuspid Valve: There is mild regurgitation.    Aorta: The aortic root is mildly dilated. The ascending aorta is mildly dilated. The aortic root is 4.60 cm.    5/4/25:    FINDINGS:     AORTA/ARTERIAL VASCULATURE: The aortic root is dilated measuring 46 mm which is unchanged from the previous study. The ascending aorta is stable in size measuring 34 mm. The aortic arch and descending thoracic aorta unremarkable. Classic branching   anatomy of the aortic arch is present without stenoses within the visualized great vessels. The abdominal aorta is unremarkable.     Incidental note is made of a bicuspid valve consistent with the patient's history.     OTHER FINDINGS:     LUNGS: Multiple bilateral pulmonary nodules measuring up to 6 mm are stable from the prior study and marked on series 305. There is no tracheal or endobronchial lesion.     PLEURA:  Unremarkable.     HEART/PULMONARY ARTERIAL TREE: Mild atherosclerotic coronary artery calcification.     MEDIASTINUM AND ERIKA:  Unremarkable.     CHEST WALL AND LOWER NECK:   Status post thyroidectomy.     VISUALIZED STRUCTURES IN THE UPPER ABDOMEN:  Unremarkable..     VISUALIZED  OSSEOUS STRUCTURES:  No acute fracture or destructive osseous lesion.     IMPRESSION:     Stable 46 mm aortic root aneurysm.     Multiple bilateral pulmonary nodules measuring up to 6 mm demonstrating stability over multiple studies..  Physical Exam    Airway   Comment: S/p right radical neck dissection with associated devation  Mallampati score: I  TM Distance: >3 FB  Neck ROM: full  Mouth opening: >= 4 cm      Cardiovascular      Dental       Pulmonary      Neurological    He appears awake, alert and oriented x3.      Other Findings        Anesthesia Plan  ASA Score- 3     Anesthesia Type- MAC with ASA Monitors.         Additional Monitors:     Airway Plan:            Plan Factors-Exercise tolerance (METS): >4 METS.    Chart reviewed.    Patient summary reviewed.    Patient is not a current smoker.  Patient did not smoke on day of surgery.            Induction- intravenous.    Postoperative Plan- .   Monitoring Plan - Monitoring plan - standard ASA monitoring      Perioperative Resuscitation Plan - Level 1 - Full Code.       Informed Consent- Anesthetic plan and risks discussed with patient.  I personally reviewed this patient with the CRNA. Discussed and agreed on the Anesthesia Plan with the CRNA..      NPO Status:  Vitals Value Taken Time   Date of last liquid 05/15/25 05/15/25 09:46   Time of last liquid 0445 05/15/25 09:46   Date of last solid 05/13/25 05/15/25 09:46   Time of last solid 2000 05/15/25 09:46

## 2025-05-15 NOTE — H&P
H&P - Gastroenterology   Name: Arron Ochoa 64 y.o. male I MRN: [id number]  Unit/Bed#:  I Date of Admission: 5/15/2025   Date of Service: 5/15/2025 I Hospital Day: 0     Assessment & Plan   This is a 64 y.o. year old male here for colonoscopy, and he is stable and optimized for his procedure.    History of Present Illness    Arron Ochoa is a 64 y.o. year old male who presents for screening    REVIEW OF SYSTEMS: Per the HPI, and otherwise unremarkable.    Historical Information   Past Medical History:   Diagnosis Date    Aneurysm of aortic root     Bicuspid aortic valve     H/O hyperthyroidism     History of diverticulitis     History of echocardiogram 2018    EF 65%, bicuspid AV with superior-inferior irientation, mild AS, mild MR, trace TR, mildly dilated ascending aorta (sinus of valsalva is 4.2-cm)    History of malignant neoplasm of tonsil     oral pharyngeal carcinoma stage III.  Status post chemoradiation completed 9/10/2010    History of rectal fissure     Hypertension     Hypothyroidism      Past Surgical History:   Procedure Laterality Date    COLONOSCOPY  10/11/2019    INGUINAL HERNIA REPAIR      TONSILLECTOMY      UMBILICAL HERNIA REPAIR       Social History     Tobacco Use    Smoking status: Former     Current packs/day: 0.00     Average packs/day: 0.5 packs/day for 20.0 years (10.0 ttl pk-yrs)     Types: Cigarettes     Start date:      Quit date:      Years since quittin.3    Smokeless tobacco: Former     Types: Chew    Tobacco comments:     on/off X about 20 years   Vaping Use    Vaping status: Never Used   Substance and Sexual Activity    Alcohol use: No    Drug use: No    Sexual activity: Not on file     E-Cigarette/Vaping    E-Cigarette Use Never User      E-Cigarette/Vaping Substances    Nicotine No     THC No     CBD No     Flavoring No     Other No     Unknown No          Meds/Allergies   Current Medications[1]  Allergies[2]    Objective :  BP: ()/()     Physical Exam  Gen:  NAD  Head: tongue defect from resection; neck atrophy right>left; good neck mobility  CV: RRR  CHEST: Clear  ABD: soft, NT/ND  EXT: no edema       [1]   Current Outpatient Medications:     alfuzosin (UROXATRAL) 10 mg 24 hr tablet    aspirin 81 mg chewable tablet    atenolol (TENORMIN) 25 mg tablet    atorvastatin (LIPITOR) 20 mg tablet    levothyroxine 88 mcg tablet    lisinopril (ZESTRIL) 10 mg tablet    MELATONIN PO    Na Sulfate-K Sulfate-Mg Sulf 17.5-3.13-1.6 GM/177ML SOLN    tamsulosin (FLOMAX) 0.4 mg  [2] No Known Allergies

## 2025-05-15 NOTE — ANESTHESIA POSTPROCEDURE EVALUATION
Post-Op Assessment Note    CV Status:  Stable  Pain Score: 0    Pain management: adequate       Mental Status:  Awake and sleepy   Hydration Status:  Euvolemic   PONV Controlled:  Controlled   Airway Patency:  Patent     Post Op Vitals Reviewed: Yes    No anethesia notable event occurred.    Staff: CRNA           Last Filed PACU Vitals:  Vitals Value Taken Time   Temp 96.9 °F (36.1 °C) 05/15/25 10:57   Pulse 94 05/15/25 10:57   /79 05/15/25 10:57   Resp 20 05/15/25 10:57   SpO2 98 % 05/15/25 10:57       Modified Román:     Vitals Value Taken Time   Activity 2 05/15/25 10:56   Respiration 2 05/15/25 10:56   Circulation 2 05/15/25 10:56   Consciousness 1 05/15/25 10:56   Oxygen Saturation 2 05/15/25 10:56     Modified Román Score: 9

## 2025-06-28 ENCOUNTER — APPOINTMENT (OUTPATIENT)
Dept: RADIOLOGY | Facility: CLINIC | Age: 65
End: 2025-06-28
Payer: COMMERCIAL

## 2025-06-28 ENCOUNTER — OFFICE VISIT (OUTPATIENT)
Dept: URGENT CARE | Facility: CLINIC | Age: 65
End: 2025-06-28
Payer: COMMERCIAL

## 2025-06-28 DIAGNOSIS — J22 LOWER RESPIRATORY TRACT INFECTION: Primary | ICD-10-CM

## 2025-06-28 DIAGNOSIS — R05.9 COUGH, UNSPECIFIED TYPE: ICD-10-CM

## 2025-06-28 PROCEDURE — 71046 X-RAY EXAM CHEST 2 VIEWS: CPT

## 2025-06-28 PROCEDURE — 99203 OFFICE O/P NEW LOW 30 MIN: CPT

## 2025-06-28 RX ORDER — CYCLOBENZAPRINE HCL 5 MG
5-10 TABLET ORAL
COMMUNITY
Start: 2025-06-25 | End: 2025-07-02

## 2025-06-28 RX ORDER — DOXYCYCLINE HYCLATE 100 MG
100 TABLET ORAL 2 TIMES DAILY
Qty: 10 TABLET | Refills: 0 | Status: SHIPPED | OUTPATIENT
Start: 2025-06-28 | End: 2025-07-03

## 2025-06-28 NOTE — PATIENT INSTRUCTIONS
Your xray report will have a final reading by a radiologist in the next 24 hours. If there is anything abnormal, the staff will be in contact with you regarding an updated plan of care.    Please follow up with your primary care provider for further evaluation and treatment.     Tylenol for pain according to package directions     Continue the muscle relaxers as ordered for your back pain    Take both antibiotics twice daily for 5 days    Eat yogurt with live and active cultures and/or take a probiotic at least 3 hours before or after antibiotic dose. Monitor stool for diarrhea and/or blood. If this occurs, contact primary care doctor ASAP.

## 2025-06-28 NOTE — PROGRESS NOTES
[facility]        NAME: Arron Ochoa is a 64 y.o. male  : 1960    MRN: [id number]  DATE: 2025  TIME: 1:00 PM    Assessment and Plan   Cough, unspecified type [R05.9]  1. 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     Multiple bilateral pulmonary nodules measuring up to 6 mm demonstrating stability over multiple studies..     3-4 week history of cough, which has changed over the course of time present, expectorating tan thicker mucus, fatigue, decreased appetite, and marked fatigue. Pt also reports recent visit to PCP for back pain and was placed on muscle relaxers which he reports have helped his back pain. He was also placed on medicine for cough and reports cough is unchanged with medication. He is here at the advise of his wife who remarked he should have a chest xray.     Given the length of symptoms and patient's associated symptoms present with ongoing cough, weight loss, fatigue and decreased appetite will place on abx and await follow up from radiology with final radiology interpretation. Advised patient we will call with any differing or concerning radiology results. Also advised to follow up with his family dr or the emergency room if his symptoms persist or do not seem to be improving in 5-7 days.     Patient Instructions       Follow up with PCP in 3-5 days.  Proceed to  ER if symptoms worsen.    If tests are performed, our office will contact you with results only if changes need to made to the care plan discussed with you at the visit. [facility]    Chief Complaint     Chief Complaint   Patient presents with    Cough      "Started with cough 3-4 weeks ago.  States cough has become loose.  He is expectorating deep beige with an odd \"butter like\" taste.  Not taking any OTC for symptoms.  Also complains of back pain and being very tired.  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  He reports the cough has become loose and productive.  He is expectorating deep Page/tan sputum and reports it has a butter like taste.  Not taking any over-the-counter medications for symptoms.  Also complaining of back pain which she was seen at his PCP for and placed on muscle relaxers and reports the back pain is much improved.  He is also endorsing fatigue and a 10 to 15 pound weight loss since the onset of the cough.  Patient has a history of cancer approximately 15 years ago to the tongue and the esophagus and had surgical intervention and radiation.        Review of Systems   Review of Systems   Constitutional:  Positive for activity change, appetite change and fatigue. Negative for chills and fever.   HENT:  Negative for congestion, ear pain and sore throat.    Respiratory:  Positive for cough.    Musculoskeletal:  Positive for back pain.         Current Medications     Current Medications[1]    Current Allergies     Allergies as of 06/28/2025    (No Known Allergies)            The following portions of the patient's history were reviewed and updated as appropriate: allergies, current medications, past family history, past medical history, past social history, past surgical history and problem list.     Past Medical History[2]    Past Surgical History[3]    Family History[4]      Medications have been verified.        Objective   /78 (BP Location: Right arm)   Pulse 88   Temp 97.9 °F (36.6 °C) (Skin)   Resp 18   Wt 74.5 kg (164 lb 3.2 oz)   SpO2 " 96%   BMI 21.66 kg/m²        Physical Exam     Physical Exam  Vitals and nursing note reviewed.   Constitutional:       Appearance: Normal appearance. He is underweight.   HENT:      Head: Normocephalic and atraumatic.      Right Ear: Tympanic membrane, ear canal and external ear normal.      Left Ear: Tympanic membrane, ear canal and external ear normal.      Nose: Nose normal.      Mouth/Throat:      Mouth: Mucous membranes are moist.      Pharynx: Oropharynx is clear.     Eyes:      Extraocular Movements: Extraocular movements intact.      Conjunctiva/sclera: Conjunctivae normal.      Pupils: Pupils are equal, round, and reactive to light.       Cardiovascular:      Rate and Rhythm: Normal rate and regular rhythm.      Pulses: Normal pulses.      Heart sounds: Normal heart sounds.   Pulmonary:      Effort: Pulmonary effort is normal.      Comments: Frequent loose productive cough  Abdominal:      General: Abdomen is flat.      Palpations: Abdomen is soft.     Musculoskeletal:         General: Normal range of motion.      Cervical back: Normal range of motion and neck supple.   Lymphadenopathy:      Cervical: No cervical adenopathy.     Skin:     General: Skin is warm and dry.      Capillary Refill: Capillary refill takes less than 2 seconds.     Neurological:      General: No focal deficit present.      Mental Status: He is alert and oriented to person, place, and time. Mental status is at baseline.                        [1]   Current Outpatient Medications:     alfuzosin (UROXATRAL) 10 mg 24 hr tablet, Take 10 mg by mouth in the morning., Disp: , Rfl:     aspirin 81 mg chewable tablet, Chew 81 mg in the morning., Disp: , Rfl:     atenolol (TENORMIN) 25 mg tablet, Take 1 tablet (25 mg total) by mouth 2 (two) times a day, Disp: 180 tablet, Rfl: 1    atorvastatin (LIPITOR) 20 mg tablet, Take 20 mg by mouth daily at bedtime, Disp: , Rfl:     cyclobenzaprine (FLEXERIL) 5 mg tablet, Take 5-10 mg by mouth, Disp: ,  Rfl:     levothyroxine 88 mcg tablet, Take 1 tablet by mouth in the morning., Disp: , Rfl: 0    MELATONIN PO, Take by mouth, Disp: , Rfl:     Na Sulfate-K Sulfate-Mg Sulf 17.5-3.13-1.6 GM/177ML SOLN, Take 1 kit by mouth see administration instructions Follow instructions given at office, Disp: 354 mL, Rfl: 0    tamsulosin (FLOMAX) 0.4 mg, Take 0.4 mg by mouth in the morning., Disp: , Rfl:     lisinopril (ZESTRIL) 10 mg tablet, Take 1 tablet (10 mg total) by mouth daily (Patient not taking: Reported on 2/3/2025), Disp: 90 tablet, Rfl: 3  [2]   Past Medical History:  Diagnosis Date    Aneurysm of aortic root     Bicuspid aortic valve     H/O hyperthyroidism     History of diverticulitis     History of echocardiogram 05/22/2018    EF 65%, bicuspid AV with superior-inferior irientation, mild AS, mild MR, trace TR, mildly dilated ascending aorta (sinus of valsalva is 4.2-cm)    History of malignant neoplasm of tonsil     oral pharyngeal carcinoma stage III.  Status post chemoradiation completed 9/10/2010    History of rectal fissure     Hypertension     Hypothyroidism    [3]   Past Surgical History:  Procedure Laterality Date    COLONOSCOPY  10/11/2019    INGUINAL HERNIA REPAIR      TONSILLECTOMY      UMBILICAL HERNIA REPAIR     [4]   Family History  Problem Relation Name Age of Onset    Dementia Mother      Colon cancer Mother      Breast cancer Mother      Colon polyps Mother      Squamous cell carcinoma Father      Colon cancer Maternal Uncle

## 2025-06-29 VITALS
RESPIRATION RATE: 18 BRPM | DIASTOLIC BLOOD PRESSURE: 80 MMHG | BODY MASS INDEX: 21.66 KG/M2 | SYSTOLIC BLOOD PRESSURE: 136 MMHG | WEIGHT: 164.2 LBS | TEMPERATURE: 97.9 F | HEART RATE: 88 BPM | OXYGEN SATURATION: 96 %

## 2025-07-23 ENCOUNTER — OFFICE VISIT (OUTPATIENT)
Dept: CARDIAC SURGERY | Facility: CLINIC | Age: 65
End: 2025-07-23
Payer: COMMERCIAL

## 2025-07-23 VITALS
HEART RATE: 50 BPM | WEIGHT: 162.7 LBS | DIASTOLIC BLOOD PRESSURE: 74 MMHG | OXYGEN SATURATION: 99 % | SYSTOLIC BLOOD PRESSURE: 112 MMHG | BODY MASS INDEX: 21.56 KG/M2 | HEIGHT: 73 IN

## 2025-07-23 DIAGNOSIS — I35.0 AORTIC STENOSIS DUE TO BICUSPID AORTIC VALVE: ICD-10-CM

## 2025-07-23 DIAGNOSIS — Q25.43 ANEURYSM OF AORTIC ROOT: Primary | ICD-10-CM

## 2025-07-23 DIAGNOSIS — Q23.81 BICUSPID AORTIC VALVE: ICD-10-CM

## 2025-07-23 DIAGNOSIS — Q23.81 AORTIC STENOSIS DUE TO BICUSPID AORTIC VALVE: ICD-10-CM

## 2025-07-23 PROCEDURE — 99214 OFFICE O/P EST MOD 30 MIN: CPT | Performed by: THORACIC SURGERY (CARDIOTHORACIC VASCULAR SURGERY)

## 2025-07-23 NOTE — PROGRESS NOTES
Aortic Surveillance - Cardiac Surgery   Arron Ochoa 64 y.o. male MRN: [id number]    History of Present Illness: Arron Ochoa is a 64 y.o. year old male who presents today for ongoing surveillance of previously identified ascending aortic aneurysm.  They were most recently evaluated in our office with CT imaging in February 2023. At that time, he was noted to have an aortic root measuring 4.6 cm and a bicuspid aortic valve with mild AS on echo. He was instructed to follow up in 2 years with repeat imaging, and returns today to review results.     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 In 2023, he underwent partial glossectomy, left lymph node dissection at [facility]. Over the last couple of months, patient was diagnosed with pneumonia., and he states that it took him a long time to recover. He was short of breath and had very little energy. He states that over the last several months, he is starting to feel better. He normally lives a very active lifestyle - walks several days a week with a weighted backpack. 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 He denies ongoing SOB, CP, back pain, LH, dizziness, syncope, LE edema.         Past Medical History:  Past Medical History:   Diagnosis Date    Aneurysm of aortic root     Bicuspid aortic valve     H/O hyperthyroidism     History of diverticulitis     History of echocardiogram 05/22/2018    EF 65%, bicuspid AV with superior-inferior irientation, mild AS, mild MR, trace TR, mildly dilated ascending aorta (sinus of valsalva is 4.2-cm)    History of malignant neoplasm of tonsil     oral pharyngeal carcinoma stage III.  Status post chemoradiation completed 9/10/2010    History of rectal fissure     Hypertension     Hypothyroidism          Past Surgical History:   Past Surgical History:   Procedure Laterality Date    COLONOSCOPY  10/11/2019    INGUINAL  HERNIA REPAIR      TONSILLECTOMY      UMBILICAL HERNIA REPAIR           Family History:  Family History   Problem Relation Name Age of Onset    Dementia Mother      Colon cancer Mother      Breast cancer Mother      Colon polyps Mother      Squamous cell carcinoma Father      Colon cancer Maternal Uncle           Social History:    Social History     Substance and Sexual Activity   Alcohol Use No     Social History     Substance and Sexual Activity   Drug Use No     Social History     Tobacco Use   Smoking Status Former    Current packs/day: 0.00    Average packs/day: 0.5 packs/day for 20.0 years (10.0 ttl pk-yrs)    Types: Cigarettes    Start date:     Quit date:     Years since quittin.5   Smokeless Tobacco Former    Types: Chew   Tobacco Comments    on/off X about 20 years       Home Medications:   Prior to Admission medications    Medication Sig Start Date End Date Taking? Authorizing Provider   alfuzosin (UROXATRAL) 10 mg 24 hr tablet Take 10 mg by mouth in the morning.   Yes Historical Provider, MD   aspirin 81 mg chewable tablet Chew 81 mg in the morning.   Yes Historical Provider, MD   atenolol (TENORMIN) 25 mg tablet Take 1 tablet (25 mg total) by mouth 2 (two) times a day 3/28/25  Yes Serjio Jaquez MD   atorvastatin (LIPITOR) 20 mg tablet Take 20 mg by mouth daily at bedtime 21  Yes Historical Provider, MD   levothyroxine 88 mcg tablet Take 1 tablet by mouth in the morning. 18  Yes Historical Provider, MD   MELATONIN PO Take by mouth   Yes Historical Provider, MD   Na Sulfate-K Sulfate-Mg Sulf 17.5-3.13-1.6 GM/177ML SOLN Take 1 kit by mouth see administration instructions Follow instructions given at office 25  Yes Miracle Merida PA-C   tamsulosin (FLOMAX) 0.4 mg Take 0.4 mg by mouth in the morning. 22  Yes Historical Provider, MD   cyclobenzaprine (FLEXERIL) 5 mg tablet Take 5-10 mg by mouth 25  Historical Provider, MD   lisinopril (ZESTRIL) 10 mg tablet Take 1  "tablet (10 mg total) by mouth daily  Patient not taking: Reported on 2/3/2025 2/15/22   Serjio Jaquez MD       Allergies:  No Known Allergies    Review of Systems:     Review of Systems   Constitutional:  Negative for chills and fever.   HENT:  Negative for ear pain and sore throat.    Eyes:  Negative for pain and visual disturbance.   Respiratory:  Negative for cough and shortness of breath.    Cardiovascular:  Negative for chest pain and palpitations.   Gastrointestinal:  Negative for abdominal pain and vomiting.   Genitourinary:  Negative for dysuria and hematuria.   Musculoskeletal:  Negative for arthralgias and back pain.   Skin:  Negative for color change and rash.   Neurological:  Negative for seizures and syncope.   All other systems reviewed and are negative.      Vital Signs:     Vitals:    07/23/25 1023 07/23/25 1026   BP: 117/63 112/74   BP Location: Left arm Right arm   Patient Position: Sitting Sitting   Cuff Size: Standard Standard   Pulse: (!) 50    SpO2: 99%    Weight: 73.8 kg (162 lb 11.2 oz)    Height: 6' 1\" (1.854 m)        Physical Exam:     Physical Exam  Vitals reviewed.   Constitutional:       Appearance: Normal appearance.   HENT:      Head: Normocephalic and atraumatic.     Eyes:      Pupils: Pupils are equal, round, and reactive to light.     Neck:      Vascular: No carotid bruit or JVD.     Cardiovascular:      Rate and Rhythm: Normal rate and regular rhythm.      Pulses:           Carotid pulses are 2+ on the right side and 2+ on the left side.       Radial pulses are 2+ on the right side and 2+ on the left side.        Dorsalis pedis pulses are 2+ on the right side and 2+ on the left side.      Heart sounds: Murmur heard.      Systolic murmur is present with a grade of 2/6.      No friction rub. No gallop.   Pulmonary:      Effort: Pulmonary effort is normal.      Breath sounds: Normal breath sounds. No wheezing, rhonchi or rales.   Abdominal:      Palpations: Abdomen is soft.      " "Tenderness: There is no abdominal tenderness.     Musculoskeletal:      Cervical back: Normal range of motion.     Skin:     General: Skin is warm and dry.      Capillary Refill: Capillary refill takes less than 2 seconds.     Neurological:      General: No focal deficit present.      Mental Status: He is alert.      Sensory: Sensation is intact.     Psychiatric:         Attention and Perception: Attention normal.         Mood and Affect: Mood normal.         Behavior: Behavior normal. Behavior is cooperative.       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     Multiple bilateral pulmonary nodules measuring up to 6 mm demonstrating stability over multiple studies..    Echocardiogram: 4/21/25  Left Ventricle Left ventricular cavity size is normal. Wall thickness is mildly increased. The left ventricular ejection fraction is 55%. Systolic function is normal. Wall motion is normal. Diastolic function is mildly abnormal, consistent with grade I (abnormal) relaxation.   Right Ventricle Right ventricular cavity size is normal. Systolic function is normal. Normal tricuspid annular plane systolic excursion (TAPSE) > 1.7 cm.   Left Atrium The atrium is normal in size.   Right Atrium The atrium is normal in size.   Aortic Valve The aortic valve is bicuspid. There is trace regurgitation. There is moderate stenosis. The aortic valve peak velocity is 2.3 m/s. The aortic valve peak gradient is 21 mmHg. The aortic valve mean gradient is 12 mmHg. The dimensionless velocity index is 0.31. The aortic valve area is 1.40 cm2. The stroke volume index is 35.00 ml/m2.   Mitral Valve There is mild regurgitation. There is no evidence of stenosis.   Tricuspid Valve Tricuspid valve structure is normal. There is mild regurgitation. There " is no evidence of stenosis.   Pulmonic Valve Pulmonic valve structure is normal. There is trace regurgitation. There is no evidence of stenosis.   Ascending Aorta The aortic root is mildly dilated. The ascending aorta is mildly dilated. The aortic root is 4.60 cm.   IVC/SVC The inferior vena cava is upper normal in size. Respirophasic changes were normal.   Pericardium There is no significant pericardial effusion appreciated.       Results Review Statement: I personally reviewed the following image studies in PACS and associated radiology reports: CTA chest and Echocardiogram. My interpretation of the radiology images/reports is: agree with above interpretation.    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  Radiographic findings of aortic root aneurysm without significant change (46 mm at it's greatest diameter), were confirmed and shared with the patient today.    cts surveillance plan: The aneurysm size remains unchanged, and does not meet surgical indications for intervention. Therefore follow-up monitoring will be the treatment plan.  Arrangements have been made for future surveillance to be completed with GATED CTA CHEST ABDOMEN PELVIS AND ECHOCARDIOGRAM in 2 Years.    Arron Ochoa was comfortable with our recommendations, and their questions were answered to their satisfaction.  Thank you for allowing us to participate in the care of this patient.     Aortic Aneurysm Instructions were provided to the  patient as follows:    1. No heavy sustained lifting which would require excessive straining  2. Maintain a controlled blood pressure with a goal of 120/80.  3. Follow up in Aortic Clinic as recommended with radiology follow up as instructed  4. Report to the ER or call 911 immediately with the following signs / symptoms: sudden onset of back pain, chest pain or shortness of breath.    The patient recently had a screening colonoscopy in 5/25/25.  Therefore GI referral is not indicated at this time.     SIGNATURE: Adrianna Bledsoe PA-C  DATE: 07/23/25  TIME: 10:49 AM

## 2025-07-23 NOTE — LETTER
July 23, 2025     Ross Mijares DO  [address] [facility] [id number]    Patient: Arron Ochoa   YOB: 1960   Date of Visit: 7/23/2025       Dear Dr. Ross Mijares DO:    Thank you for referring Arron Ochoa to me for evaluation. Below are my notes for this consultation.    If you have questions, please do not hesitate to call me. I look forward to following your patient along with you.         Sincerely,        Aroldo Morrison MD        CC: No Recipients    Adrianna Bledsoe PA-C  7/23/2025 11:33 AM  Attested  Aortic Surveillance - Cardiac Surgery   Arron Ochoa 64 y.o. male MRN: [id number]    History of Present Illness: Arron Ochoa is a 64 y.o. year old male who presents today for ongoing surveillance of previously identified ascending aortic aneurysm.  They were most recently evaluated in our office with CT imaging in February 2023. At that time, he was noted to have an aortic root measuring 4.6 cm and a bicuspid aortic valve with mild AS on echo. He was instructed to follow up in 2 years with repeat imaging, and returns today to review results.     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 In 2023, he underwent partial glossectomy, left lymph node dissection at [facility]. Over the last couple of months, patient was diagnosed with pneumonia., and he states that it took him a long time to recover. He was short of breath and had very little energy. He states that over the last several months, he is starting to feel better. He normally lives a very active lifestyle - walks several days a week with a weighted backpack. 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 He denies ongoing SOB, CP, back pain, LH, dizziness, syncope, LE edema.         Past Medical History:  Past Medical History:   Diagnosis Date   • Aneurysm of aortic root    • Bicuspid aortic valve    • H/O  hyperthyroidism    • History of diverticulitis    • History of echocardiogram 2018    EF 65%, bicuspid AV with superior-inferior irientation, mild AS, mild MR, trace TR, mildly dilated ascending aorta (sinus of valsalva is 4.2-cm)   • History of malignant neoplasm of tonsil     oral pharyngeal carcinoma stage III.  Status post chemoradiation completed 9/10/2010   • History of rectal fissure    • Hypertension    • Hypothyroidism          Past Surgical History:   Past Surgical History:   Procedure Laterality Date   • COLONOSCOPY  10/11/2019   • INGUINAL HERNIA REPAIR     • TONSILLECTOMY     • UMBILICAL HERNIA REPAIR           Family History:  Family History   Problem Relation Name Age of Onset   • Dementia Mother     • Colon cancer Mother     • Breast cancer Mother     • Colon polyps Mother     • Squamous cell carcinoma Father     • Colon cancer Maternal Uncle           Social History:    Social History     Substance and Sexual Activity   Alcohol Use No     Social History     Substance and Sexual Activity   Drug Use No     Social History     Tobacco Use   Smoking Status Former   • Current packs/day: 0.00   • Average packs/day: 0.5 packs/day for 20.0 years (10.0 ttl pk-yrs)   • Types: Cigarettes   • Start date:    • Quit date:    • Years since quittin.5   Smokeless Tobacco Former   • Types: Chew   Tobacco Comments    on/off X about 20 years       Home Medications:   Prior to Admission medications    Medication Sig Start Date End Date Taking? Authorizing Provider   alfuzosin (UROXATRAL) 10 mg 24 hr tablet Take 10 mg by mouth in the morning.   Yes Historical Provider, MD   aspirin 81 mg chewable tablet Chew 81 mg in the morning.   Yes Historical Provider, MD   atenolol (TENORMIN) 25 mg tablet Take 1 tablet (25 mg total) by mouth 2 (two) times a day 3/28/25  Yes Serjio Jaquez MD   atorvastatin (LIPITOR) 20 mg tablet Take 20 mg by mouth daily at bedtime 21  Yes Historical Provider, MD  "  levothyroxine 88 mcg tablet Take 1 tablet by mouth in the morning. 6/5/18  Yes Historical Provider, MD   MELATONIN PO Take by mouth   Yes Historical Provider, MD   Na Sulfate-K Sulfate-Mg Sulf 17.5-3.13-1.6 GM/177ML SOLN Take 1 kit by mouth see administration instructions Follow instructions given at office 2/17/25  Yes Miracle Merida PA-C   tamsulosin (FLOMAX) 0.4 mg Take 0.4 mg by mouth in the morning. 7/21/22  Yes Historical Provider, MD   cyclobenzaprine (FLEXERIL) 5 mg tablet Take 5-10 mg by mouth 6/25/25 7/2/25  Historical Provider, MD   lisinopril (ZESTRIL) 10 mg tablet Take 1 tablet (10 mg total) by mouth daily  Patient not taking: Reported on 2/3/2025 2/15/22   Serjio Jaquez MD       Allergies:  No Known Allergies    Review of Systems:     Review of Systems   Constitutional:  Negative for chills and fever.   HENT:  Negative for ear pain and sore throat.    Eyes:  Negative for pain and visual disturbance.   Respiratory:  Negative for cough and shortness of breath.    Cardiovascular:  Negative for chest pain and palpitations.   Gastrointestinal:  Negative for abdominal pain and vomiting.   Genitourinary:  Negative for dysuria and hematuria.   Musculoskeletal:  Negative for arthralgias and back pain.   Skin:  Negative for color change and rash.   Neurological:  Negative for seizures and syncope.   All other systems reviewed and are negative.      Vital Signs:     Vitals:    07/23/25 1023 07/23/25 1026   BP: 117/63 112/74   BP Location: Left arm Right arm   Patient Position: Sitting Sitting   Cuff Size: Standard Standard   Pulse: (!) 50    SpO2: 99%    Weight: 73.8 kg (162 lb 11.2 oz)    Height: 6' 1\" (1.854 m)        Physical Exam:     Physical Exam  Vitals reviewed.   Constitutional:       Appearance: Normal appearance.   HENT:      Head: Normocephalic and atraumatic.     Eyes:      Pupils: Pupils are equal, round, and reactive to light.     Neck:      Vascular: No carotid bruit or JVD.     Cardiovascular: " "     Rate and Rhythm: Normal rate and regular rhythm.      Pulses:           Carotid pulses are 2+ on the right side and 2+ on the left side.       Radial pulses are 2+ on the right side and 2+ on the left side.        Dorsalis pedis pulses are 2+ on the right side and 2+ on the left side.      Heart sounds: Murmur heard.      Systolic murmur is present with a grade of 2/6.      No friction rub. No gallop.   Pulmonary:      Effort: Pulmonary effort is normal.      Breath sounds: Normal breath sounds. No wheezing, rhonchi or rales.   Abdominal:      Palpations: Abdomen is soft.      Tenderness: There is no abdominal tenderness.     Musculoskeletal:      Cervical back: Normal range of motion.     Skin:     General: Skin is warm and dry.      Capillary Refill: Capillary refill takes less than 2 seconds.     Neurological:      General: No focal deficit present.      Mental Status: He is alert.      Sensory: Sensation is intact.     Psychiatric:         Attention and Perception: Attention normal.         Mood and Affect: Mood normal.         Behavior: Behavior normal. Behavior is cooperative.       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     Multiple bilateral pulmonary nodules measuring up to 6 mm demonstrating stability over multiple studies..    Echocardiogram: 4/21/25  Left Ventricle Left ventricular cavity size is normal. Wall thickness is mildly increased. The left ventricular ejection fraction is 55%. Systolic function is normal. Wall motion is normal. Diastolic function is mildly abnormal, consistent with grade I (abnormal) relaxation.   Right Ventricle Right ventricular cavity size is normal. Systolic function is normal. Normal tricuspid annular plane systolic excursion (TAPSE) > 1.7 cm.   Left Atrium The " atrium is normal in size.   Right Atrium The atrium is normal in size.   Aortic Valve The aortic valve is bicuspid. There is trace regurgitation. There is moderate stenosis. The aortic valve peak velocity is 2.3 m/s. The aortic valve peak gradient is 21 mmHg. The aortic valve mean gradient is 12 mmHg. The dimensionless velocity index is 0.31. The aortic valve area is 1.40 cm2. The stroke volume index is 35.00 ml/m2.   Mitral Valve There is mild regurgitation. There is no evidence of stenosis.   Tricuspid Valve Tricuspid valve structure is normal. There is mild regurgitation. There is no evidence of stenosis.   Pulmonic Valve Pulmonic valve structure is normal. There is trace regurgitation. There is no evidence of stenosis.   Ascending Aorta The aortic root is mildly dilated. The ascending aorta is mildly dilated. The aortic root is 4.60 cm.   IVC/SVC The inferior vena cava is upper normal in size. Respirophasic changes were normal.   Pericardium There is no significant pericardial effusion appreciated.       Results Review Statement: I personally reviewed the following image studies in PACS and associated radiology reports: CTA chest and Echocardiogram. My interpretation of the radiology images/reports is: agree with above interpretation.    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  Radiographic findings of aortic root aneurysm without significant change (46 mm at it's greatest diameter), were confirmed  and shared with the patient today.    cts surveillance plan: The aneurysm size remains unchanged, and does not meet surgical indications for intervention. Therefore follow-up monitoring will be the treatment plan.  Arrangements have been made for future surveillance to be completed with GATED CTA CHEST ABDOMEN PELVIS AND ECHOCARDIOGRAM in 2 Years.    Arron Ochoa was comfortable with our recommendations, and their questions were answered to their satisfaction.  Thank you for allowing us to participate in the care of this patient.     Aortic Aneurysm Instructions were provided to the patient as follows:    1. No heavy sustained lifting which would require excessive straining  2. Maintain a controlled blood pressure with a goal of 120/80.  3. Follow up in Aortic Clinic as recommended with radiology follow up as instructed  4. Report to the ER or call 911 immediately with the following signs / symptoms: sudden onset of back pain, chest pain or shortness of breath.    The patient recently had a screening colonoscopy in 5/25/25.  Therefore GI referral is not indicated at this time.     SIGNATURE: Adrianna Bledsoe PA-C  DATE: 07/23/25  TIME: 10:49 AM  Attestation signed by Aroldo Morrison MD at 7/23/2025 11:50 AM:  Attending Attestation:    I supervised the Advanced Practitioner.? In addition to personally performing portions of the history and physical exam, I performed, in its entirety, the assessment/plan/medical decision making/counseling/care coordination component of the visit.  I reviewed the Advanced Practitioner's note,  medications prescribed and orders placed.    Medical decision making is detailed below:    The patient is a very pleasant 64-year-old man returns to aortic clinic for follow-up of an aortic root aneurysm in the setting of a bicuspid aortic valve.  He is asymptomatic.  I have personally reviewed his diagnostic images, a CTA of the chest on 5/6/2025 shows an ascending aorta of 3.4 cm and an  aortic root of 4.6 cm which remains unchanged compared to studies dating back to 2016.  A TTE on 4/21/2026 shows EF of 55%, bicuspid aortic valve, peak velocity 2.3 m/s, mean gradient 12 mmHg, ISMAEL 1.4 cm², which mean he has just crossed into the moderate aortic stenosis category.  There is no need for intervention at this time, we talked about the importance of maintaining adequate blood pressure control, avoid tobacco products, avoid sustained straining, he was instructed to go to the emergency department if he were to experience severe chest pain or severe back pain.  I would like to see him back in 2 years with a CTA of the chest and a transthoracic echo.    Aroldo Morrison MD  07/23/25  11:46 AM

## 2025-07-31 DIAGNOSIS — I10 BENIGN HYPERTENSION: ICD-10-CM

## 2025-07-31 RX ORDER — ATENOLOL 25 MG/1
25 TABLET ORAL 2 TIMES DAILY
Qty: 180 TABLET | Refills: 1 | Status: SHIPPED | OUTPATIENT
Start: 2025-07-31